# Patient Record
Sex: MALE | Race: WHITE | NOT HISPANIC OR LATINO | Employment: STUDENT | URBAN - METROPOLITAN AREA
[De-identification: names, ages, dates, MRNs, and addresses within clinical notes are randomized per-mention and may not be internally consistent; named-entity substitution may affect disease eponyms.]

---
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
## 2023-01-03 ENCOUNTER — TELEMEDICINE (OUTPATIENT)
Dept: PSYCHIATRY | Facility: CLINIC | Age: 18
End: 2023-01-03

## 2023-01-03 DIAGNOSIS — F41.1 GAD (GENERALIZED ANXIETY DISORDER): ICD-10-CM

## 2023-01-03 DIAGNOSIS — M25.512 ACUTE PAIN OF LEFT SHOULDER: ICD-10-CM

## 2023-01-03 DIAGNOSIS — F42.2 MIXED OBSESSIONAL THOUGHTS AND ACTS: Primary | ICD-10-CM

## 2023-01-03 RX ORDER — SERTRALINE HYDROCHLORIDE 100 MG/1
200 TABLET, FILM COATED ORAL DAILY
Qty: 180 TABLET | Refills: 0 | Status: SHIPPED | OUTPATIENT
Start: 2023-01-03

## 2023-01-03 NOTE — BH TREATMENT PLAN
TREATMENT PLAN (Medication Management Only)         [facility]     Name and Date of Birth:  Oziel Owensyssa Carry y o  2005  Date of Treatment Plan: August 4, 2020, updated November 11, 2020, June 23, 2021, December 22, 2021, July 13, 2022, January 3, 2023  Diagnosis/Diagnoses:    1  Mixed obsessional thoughts and acts       Strengths/Personal Resources for Self-Care: supportive family, taking medications as prescribed, ability to communicate needs, ability to listen, average or above intelligence  Area/Areas of need (in own words): obsessive-compulsive symptoms  1          Long Term Goal: continue improvement in obsessive thoughts and compulsions  Target Date: 6 months - 7/3/2023  Person/Persons responsible for completion of goal: Oziel and provider  2          Short Term Objective (s) - How will we reach this goal?:   A  Provider new recommended medication/dosage changes and/or continue medication(s): continue current medications as prescribed  B  reframing negative thoughts  C  Recognizing distorted thought  Target Date: 6 months - 7/3/2023  Person/Persons Responsible for Completion of Goal: Oziel and hillary  Progress Towards Goals: continuing treatment  Treatment Modality: medication management every 3 months  Review due 180 days from date of this plan: 6 months -7 /3/2023  Expected length of service: ongoing treatment  My Physician/PA/NP and I have developed this plan together and I agree to work on the goals and objectives   I understand the treatment goals that were developed for my treatment

## 2023-01-03 NOTE — PSYCH
Virtual Regular Visit    Problem List Items Addressed This Visit    None    Reason for visit is   Chief Complaint   Patient presents with   • OCD   • Depression   • Medication Management     Encounter provider Sam Cobian, PhD    Provider located at [address] [place] [address]  [address]  [facility]  [phone]    Recent Visits  No visits were found meeting these conditions  Showing recent visits within past 7 days and meeting all other requirements  Today's Visits  Date Type Provider Dept   23 Telemedicine Sam Cobian, PhD [facility]   Showing today's visits and meeting all other requirements  Future Appointments  No visits were found meeting these conditions  Showing future appointments within next 150 days and meeting all other requirements       After connecting through [employer], the patient was identified by name and date of birth  Aleida Boyer was informed that this is a telemedicine visit and that the visit is being conducted through the [address] Now platform  He agrees to proceed  which may not be secure and therefore, might not be HIPAA-compliant  My office door was closed  No one else was in the room  He acknowledged consent and understanding of privacy and security of the video platform  The patient has agreed to participate and understands they can discontinue the visit at any time  SUBJECTIVE:    Aleida Boyer is a 21 y o  male with a history of OCD, anxiety, depression seen for medication management and mood assessment  Lamonte Barahona reports he is doing well  Cat of 15 years  and he is sad  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 and meals to 5,000 for the year  He has a group of good friends who are social and devout catholics  He is active at school and his Anglican  Lamonte Barahona reports dating a girl from school   OCD increased slightly after cat ; however, it is stabilizing  He is working, eating and sleeping  Occasional intrusive thought  Denies compulsions  Takes medication as prescribed and it is effective  HPI ROS Appetite Changes and Sleep: normal appetite and normal energy level    Review Of Systems:     Mood Anxiety occasionally   Behavior Compulsive Behavior rarely   Thought Content Normal   General Emotional Problems   Personality Normal   Other Psych Symptoms Normal   Constitutional As Noted in HPI   ENT As Noted in HPI   Cardiovascular As Noted in HPI   Respiratory As Noted in HPI   Gastrointestinal As Noted in HPI   Genitourinary As Noted in HPI   Musculoskeletal As Noted in HPI   Integumentary As Noted in HPI   Neurological As Noted in HPI   Endocrine Normal    Other Symptoms Normal        Substance Abuse History:    Social History     Substance and Sexual Activity   Drug Use Never       Family Psychiatric History:     History reviewed  No pertinent family history      Social History     Socioeconomic History   • Marital status: Single     Spouse name: Not on file   • Number of children: Not on file   • Years of education: Not on file   • Highest education level: Not on file   Occupational History   • Not on file   Tobacco Use   • Smoking status: Never   • Smokeless tobacco: Never   Substance and Sexual Activity   • Alcohol use: Never   • Drug use: Never   • Sexual activity: Not on file   Other Topics Concern   • Not on file   Social History Narrative    ** Merged History Encounter **          Social Determinants of Health     Financial Resource Strain: Not on file   Food Insecurity: Not on file   Transportation Needs: Not on file   Physical Activity: Not on file   Stress: Not on file   Social Connections: Not on file   Intimate Partner Violence: Not on file   Housing Stability: Not on file       Past Medical History:   Diagnosis Date   • Allergic rhinitis    • Anxiety    • Asthma    • Seasonal allergies        Past Surgical History:   Procedure Laterality Date   • NO PAST SURGERIES         Current Outpatient Medications   Medication Sig Dispense Refill   • albuterol (PROVENTIL HFA,VENTOLIN HFA) 90 mcg/act inhaler Inhale 2 puffs every 6 (six) hours as needed for wheezing  • diphenhydrAMINE (BENADRYL) 50 MG tablet Take 50 mg by mouth every 6 (six) hours as needed for itching  • EPINEPHrine (EPIPEN) 0 3 mg/0 3 mL SOAJ      • fluticasone (FLONASE) 50 mcg/act nasal spray 1 spray into each nostril daily as needed for rhinitis     • Loratadine (CLARITIN PO) Take by mouth daily as needed      • MELATONIN PO Take by mouth     • montelukast (SINGULAIR) 10 mg tablet Take 10 mg by mouth daily at bedtime  • Multiple Vitamins-Minerals (Multivitamin Adult) CHEW Chew daily     • sertraline (ZOLOFT) 100 mg tablet TAKE 2 TABLETS BY MOUTH EVERY  tablet 0     No current facility-administered medications for this visit          Allergies   Allergen Reactions   • Nuts - Food Allergy Anaphylaxis     Tree nuts and peanuts   • Pollen Extract Nasal Congestion       The following portions of the patient's history were reviewed and updated as appropriate: allergies, current medications, past family history, past medical history, past social history, past surgical history and problem list     OBJECTIVE:     Mental Status Examination:    Appearance calm and cooperative , adequate hygiene and grooming and good eye contact    Mood euthymic   Affect affect appropriate    Speech a normal rate   Thought Processes normal thought processes   Hallucinations no hallucinations present    Thought Content no delusions   Abnormal Thoughts no suicidal thoughts  and no homicidal thoughts    Orientation  oriented to person and place and time   Remote Memory short term memory intact and long term memory intact   Attention Span concentration intact   Intellect Appears to be of Average Intelligence   Insight Insight intact   Judgement judgment was intact   Muscle Strength Muscle strength and tone were normal   Language no difficulty naming common objects   Fund of Knowledge displays adequate knowledge of current events   Pain none   Pain Scale 0       Laboratory Results: No results found for this or any previous visit  Assessment/Plan:       There are no diagnoses linked to this encounter        Treatment Recommendations- Risks Benefits      Immediate Medical/Psychiatric/Psychotherapy Treatments and Any Precautions:  reviewed medication continue with treatment plan    Risks, Benefits And Possible Side Effects Of Medications:  {PSYCH RISK, BENEFITS AND POSSIBLE SIDE EFFECTS (Optional):82108    Psychotherapy Provided: 25 min  Supportive therapy  Medication evaluation  Mood assessment  Coping with the loss of his cat  Goals for the upcoming year    Goals discussed in session: stable moods, control of OCD symptoms    Treatment Plan:    Completed and signed during the session: Yes - Treatment Plan done but not signed at time of office visit due to:  Plan reviewed by video and verbal consent given due to [address] enacted  August 4, 2020, updated November 11, 2020, June 23, 2021, December 22, 2021, July 13, 2022, January 3, 2023      Lianet Fox, PhD 01/03/23

## 2023-04-04 DIAGNOSIS — F42.2 MIXED OBSESSIONAL THOUGHTS AND ACTS: ICD-10-CM

## 2023-04-04 RX ORDER — SERTRALINE HYDROCHLORIDE 100 MG/1
TABLET, FILM COATED ORAL
Qty: 180 TABLET | Refills: 0 | Status: SHIPPED | OUTPATIENT
Start: 2023-04-04

## 2023-05-06 ENCOUNTER — HOSPITAL ENCOUNTER (EMERGENCY)
Facility: HOSPITAL | Age: 18
End: 2023-05-07
Attending: EMERGENCY MEDICINE
Payer: COMMERCIAL

## 2023-05-06 DIAGNOSIS — R45.851 SUICIDAL IDEATION: Primary | ICD-10-CM

## 2023-05-06 LAB
ALBUMIN SERPL-MCNC: 4.2 G/DL (ref 3.4–5)
ALBUMIN SERPL-MCNC: 5.4 G/DL (ref 3.4–5)
ALP SERPL-CCNC: 64 IU/L (ref 35–126)
ALP SERPL-CCNC: 83 IU/L (ref 35–126)
ALT SERPL-CCNC: 16 IU/L (ref 16–63)
ALT SERPL-CCNC: 17 IU/L (ref 16–63)
AMPHET UR QL SCN: NOT DETECTED
ANION GAP SERPL CALC-SCNC: 18 MEQ/L (ref 3–15)
ANION GAP SERPL CALC-SCNC: 7 MEQ/L (ref 3–15)
APAP SERPL-MCNC: <10 UG/ML (ref 10–30)
AST SERPL-CCNC: 26 IU/L (ref 15–41)
AST SERPL-CCNC: 33 IU/L (ref 15–41)
BARBITURATES UR QL SCN: NOT DETECTED
BASOPHILS # BLD: 0.06 K/UL (ref 0.01–0.1)
BASOPHILS NFR BLD: 0.5 %
BENZODIAZ UR QL SCN: NOT DETECTED
BILIRUB SERPL-MCNC: 0.6 MG/DL (ref 0.3–1.2)
BILIRUB SERPL-MCNC: 0.7 MG/DL (ref 0.3–1.2)
BUN SERPL-MCNC: 16 MG/DL (ref 8–20)
BUN SERPL-MCNC: 18 MG/DL (ref 8–20)
CALCIUM SERPL-MCNC: 10 MG/DL (ref 8.9–10.3)
CALCIUM SERPL-MCNC: 8.4 MG/DL (ref 8.9–10.3)
CANNABINOIDS UR QL SCN: NOT DETECTED
CHLORIDE SERPL-SCNC: 105 MEQ/L (ref 98–109)
CHLORIDE SERPL-SCNC: 109 MEQ/L (ref 98–109)
CO2 SERPL-SCNC: 16 MEQ/L (ref 22–32)
CO2 SERPL-SCNC: 23 MEQ/L (ref 22–32)
COCAINE UR QL SCN: NOT DETECTED
CREAT SERPL-MCNC: 0.8 MG/DL (ref 0.8–1.3)
CREAT SERPL-MCNC: 0.9 MG/DL (ref 0.8–1.3)
DIFFERENTIAL METHOD BLD: ABNORMAL
EOSINOPHIL # BLD: 0.1 K/UL (ref 0.04–0.54)
EOSINOPHIL NFR BLD: 0.8 %
ERYTHROCYTE [DISTWIDTH] IN BLOOD BY AUTOMATED COUNT: 12.1 % (ref 11.6–14.4)
ETHANOL SERPL-MCNC: 18 MG/DL
GFR SERPL CREATININE-BSD FRML MDRD: >60 ML/MIN/1.73M*2
GFR SERPL CREATININE-BSD FRML MDRD: >60 ML/MIN/1.73M*2
GLUCOSE SERPL-MCNC: 112 MG/DL (ref 70–99)
GLUCOSE SERPL-MCNC: 143 MG/DL (ref 70–99)
HCT VFR BLDCO AUTO: 44.5 % (ref 40.1–51)
HGB BLD-MCNC: 15.9 G/DL (ref 13.7–17.5)
IMM GRANULOCYTES # BLD AUTO: 0.06 K/UL (ref 0–0.08)
IMM GRANULOCYTES NFR BLD AUTO: 0.5 %
LYMPHOCYTES # BLD: 1.3 K/UL (ref 1.2–3.5)
LYMPHOCYTES NFR BLD: 11 %
MCH RBC QN AUTO: 31.2 PG (ref 28–33.2)
MCHC RBC AUTO-ENTMCNC: 35.7 G/DL (ref 32.2–36.5)
MCV RBC AUTO: 87.3 FL (ref 83–98)
MONOCYTES # BLD: 0.75 K/UL (ref 0.3–1)
MONOCYTES NFR BLD: 6.4 %
NEUTROPHILS # BLD: 9.51 K/UL (ref 1.7–7)
NEUTS SEG NFR BLD: 80.8 %
NRBC BLD-RTO: 0 %
OPIATES UR QL SCN: NOT DETECTED
PCP UR QL SCN: NOT DETECTED
PDW BLD AUTO: 11.4 FL (ref 9.4–12.4)
PLATELET # BLD AUTO: 264 K/UL (ref 150–350)
POTASSIUM SERPL-SCNC: 3.4 MEQ/L (ref 3.6–5.1)
POTASSIUM SERPL-SCNC: 3.7 MEQ/L (ref 3.6–5.1)
PROT SERPL-MCNC: 6.8 G/DL (ref 6–8.2)
PROT SERPL-MCNC: 8.6 G/DL (ref 6–8.2)
RBC # BLD AUTO: 5.1 M/UL (ref 4.5–5.8)
SALICYLATES SERPL-MCNC: <4 MG/DL
SARS-COV-2 RNA RESP QL NAA+PROBE: NEGATIVE
SODIUM SERPL-SCNC: 139 MEQ/L (ref 136–144)
SODIUM SERPL-SCNC: 139 MEQ/L (ref 136–144)
WBC # BLD AUTO: 11.78 K/UL (ref 3.8–10.5)

## 2023-05-06 PROCEDURE — 36415 COLL VENOUS BLD VENIPUNCTURE: CPT | Performed by: EMERGENCY MEDICINE

## 2023-05-06 PROCEDURE — 80053 COMPREHEN METABOLIC PANEL: CPT | Performed by: EMERGENCY MEDICINE

## 2023-05-06 PROCEDURE — 99284 EMERGENCY DEPT VISIT MOD MDM: CPT | Mod: 25

## 2023-05-06 PROCEDURE — 96375 TX/PRO/DX INJ NEW DRUG ADDON: CPT

## 2023-05-06 PROCEDURE — 96361 HYDRATE IV INFUSION ADD-ON: CPT

## 2023-05-06 PROCEDURE — 85025 COMPLETE CBC W/AUTO DIFF WBC: CPT | Performed by: EMERGENCY MEDICINE

## 2023-05-06 PROCEDURE — 25800000 HC PHARMACY IV SOLUTIONS: Performed by: STUDENT IN AN ORGANIZED HEALTH CARE EDUCATION/TRAINING PROGRAM

## 2023-05-06 PROCEDURE — 3E033NZ INTRODUCTION OF ANALGESICS, HYPNOTICS, SEDATIVES INTO PERIPHERAL VEIN, PERCUTANEOUS APPROACH: ICD-10-PCS | Performed by: EMERGENCY MEDICINE

## 2023-05-06 PROCEDURE — 63600000 HC DRUGS/DETAIL CODE: Performed by: STUDENT IN AN ORGANIZED HEALTH CARE EDUCATION/TRAINING PROGRAM

## 2023-05-06 PROCEDURE — U0002 COVID-19 LAB TEST NON-CDC: HCPCS | Performed by: EMERGENCY MEDICINE

## 2023-05-06 PROCEDURE — G0480 DRUG TEST DEF 1-7 CLASSES: HCPCS | Performed by: EMERGENCY MEDICINE

## 2023-05-06 PROCEDURE — 80307 DRUG TEST PRSMV CHEM ANLYZR: CPT | Performed by: STUDENT IN AN ORGANIZED HEALTH CARE EDUCATION/TRAINING PROGRAM

## 2023-05-06 PROCEDURE — 3E033GC INTRODUCTION OF OTHER THERAPEUTIC SUBSTANCE INTO PERIPHERAL VEIN, PERCUTANEOUS APPROACH: ICD-10-PCS | Performed by: EMERGENCY MEDICINE

## 2023-05-06 PROCEDURE — 84450 TRANSFERASE (AST) (SGOT): CPT | Performed by: STUDENT IN AN ORGANIZED HEALTH CARE EDUCATION/TRAINING PROGRAM

## 2023-05-06 PROCEDURE — 3E0337Z INTRODUCTION OF ELECTROLYTIC AND WATER BALANCE SUBSTANCE INTO PERIPHERAL VEIN, PERCUTANEOUS APPROACH: ICD-10-PCS | Performed by: EMERGENCY MEDICINE

## 2023-05-06 PROCEDURE — 96374 THER/PROPH/DIAG INJ IV PUSH: CPT

## 2023-05-06 RX ORDER — LORAZEPAM 2 MG/ML
1 INJECTION INTRAMUSCULAR EVERY 2 HOUR PRN
Status: DISCONTINUED | OUTPATIENT
Start: 2023-05-06 | End: 2023-05-07 | Stop reason: HOSPADM

## 2023-05-06 RX ORDER — LORAZEPAM 2 MG/ML
1 INJECTION INTRAMUSCULAR EVERY 30 MIN PRN
Status: DISCONTINUED | OUTPATIENT
Start: 2023-05-06 | End: 2023-05-07 | Stop reason: HOSPADM

## 2023-05-06 RX ORDER — ALBUTEROL SULFATE 90 UG/1
2 INHALANT RESPIRATORY (INHALATION) EVERY 6 HOURS PRN
COMMUNITY

## 2023-05-06 RX ORDER — SERTRALINE HYDROCHLORIDE 25 MG/1
200 TABLET, FILM COATED ORAL DAILY
COMMUNITY
End: 2023-05-07 | Stop reason: SDUPTHER

## 2023-05-06 RX ORDER — LORAZEPAM 2 MG/ML
1 INJECTION INTRAMUSCULAR
Status: DISCONTINUED | OUTPATIENT
Start: 2023-05-06 | End: 2023-05-07 | Stop reason: HOSPADM

## 2023-05-06 RX ORDER — ONDANSETRON HYDROCHLORIDE 2 MG/ML
4 INJECTION, SOLUTION INTRAVENOUS ONCE
Status: COMPLETED | OUTPATIENT
Start: 2023-05-06 | End: 2023-05-06

## 2023-05-06 RX ADMIN — LORAZEPAM 1 MG: 2 INJECTION INTRAMUSCULAR; INTRAVENOUS at 17:03

## 2023-05-06 RX ADMIN — ONDANSETRON 4 MG: 2 INJECTION INTRAMUSCULAR; INTRAVENOUS at 15:52

## 2023-05-06 RX ADMIN — SODIUM CHLORIDE 1000 ML: 9 INJECTION, SOLUTION INTRAVENOUS at 15:54

## 2023-05-06 RX ADMIN — SODIUM CHLORIDE 1000 ML: 9 INJECTION, SOLUTION INTRAVENOUS at 17:03

## 2023-05-06 ASSESSMENT — ENCOUNTER SYMPTOMS
VOMITING: 1
DIAPHORESIS: 1
FEVER: 0
DIFFICULTY URINATING: 0
NECK PAIN: 0
NAUSEA: 1
HEADACHES: 0
WEAKNESS: 0
SHORTNESS OF BREATH: 0
BACK PAIN: 0
ABDOMINAL PAIN: 0
NERVOUS/ANXIOUS: 1
HALLUCINATIONS: 0

## 2023-05-06 NOTE — ED PROVIDER NOTES
"Emergency Medicine Note  HPI   HISTORY OF PRESENT ILLNESS       History provided by:  Patient  History limited by: EtOH intoxication.   used: No       17 y/o M with PMH of asthma, depression presents to the ED for psychiatric evaluation.  Patient with a history of depression for which he regularly takes Zoloft.  Last few weeks patient reports that he has had increased stressors with finals at school at [employer] and preparing for the LSAT.  Additionally, patient is Roman Catholic and identifies as delvalle.  He notes this weighs on him heavily.  He has been suicidal for a while but everything \"came to ahead last night\" while drinking.  He notes that he purposely goes out to drink as a form of self-harm where he drinks heavily.  He drank a significant amount of hard liquor last night.  Last drink around 3 or 4 AM last night.  He called the suicide hotline this morning and asked his friend to bring him here for further evaluation.  Upon arrival here, patient is actively suicidal and he had a plan to end his life with either drugs or alcohol.  No prior suicidal attempts.  He does not actively follow with a therapist or psychiatrist.  He has no prior inpatient hospitalizations.  Denies homicidal ideation, self-harm behaviors, or auditory visual hallucinations.  He is actively diaphoretic with nausea and vomiting.  Denies abdominal pain, chest pain, trouble breathing, urinary symptoms, headaches.  No recent illness.          Patient History   PAST HISTORY     Reviewed from Nursing Triage:  Tobacco  Allergies  Meds  Problems  Med Hx  Surg Hx  Fam Hx  Soc   Hx      Past Medical History:   Diagnosis Date   • Asthma    • Depression        History reviewed. No pertinent surgical history.    History reviewed. No pertinent family history.           Review of Systems   REVIEW OF SYSTEMS     Review of Systems   Reason unable to perform ROS: EtOH intoxication.   Constitutional: Positive for " diaphoresis. Negative for fever.   HENT: Negative for congestion.    Respiratory: Negative for shortness of breath.    Cardiovascular: Negative for chest pain.   Gastrointestinal: Positive for nausea and vomiting. Negative for abdominal pain.   Genitourinary: Negative for difficulty urinating.   Musculoskeletal: Negative for back pain and neck pain.   Skin: Negative for rash.   Neurological: Negative for weakness and headaches.   Psychiatric/Behavioral: Positive for suicidal ideas. Negative for hallucinations. The patient is nervous/anxious.          VITALS     ED Vitals    Date/Time Temp Pulse Resp BP SpO2 [facility]   05/07/23 1132 -- 87 15 108/62 98 % LMS   05/07/23 0923 -- 90 17 111/69 98 % LMS   05/06/23 2015 -- 106 -- 116/61 -- OMB   05/06/23 1945 -- 106 18 116/61 98 % OMB   05/06/23 1705 -- 103 17 119/63 98 % OMB   05/06/23 1531 36 °C (96.8 °F) 118 18 127/67 98 % OMB        Pulse Ox %: 98 % (05/06/23 1531)  Pulse Ox Interpretation: Normal (05/06/23 1531)  Heart Rate: 118 (05/06/23 1531)        Physical Exam   PHYSICAL EXAM     Physical Exam  Vitals and nursing note reviewed.   Constitutional:       General: He is not in acute distress.     Appearance: He is well-developed. He is diaphoretic.   HENT:      Head: Normocephalic and atraumatic.      Nose: Nose normal.      Mouth/Throat:      Mouth: Mucous membranes are moist.   Eyes:      Conjunctiva/sclera: Conjunctivae normal.   Cardiovascular:      Rate and Rhythm: Regular rhythm. Tachycardia present.      Heart sounds: Normal heart sounds.   Pulmonary:      Effort: Pulmonary effort is normal. No respiratory distress.      Breath sounds: Normal breath sounds.   Abdominal:      Palpations: Abdomen is soft.      Tenderness: There is no abdominal tenderness.   Musculoskeletal:         General: Normal range of motion.      Cervical back: Normal range of motion and neck supple.   Skin:     General: Skin is warm.      Findings: No rash.   Neurological:      General: No  focal deficit present.      Mental Status: He is alert and oriented to person, place, and time.      Comments: Tremulous.   Psychiatric:         Mood and Affect: Mood is anxious and depressed.         Speech: Speech normal.         Thought Content: Thought content includes suicidal ideation. Thought content does not include homicidal ideation. Thought content includes suicidal plan. Thought content does not include homicidal plan.           PROCEDURES     Procedures     DATA     Results     Procedure Component Value Units Date/Time    [place] Draw Panel [[id number]] Collected: 05/06/23 1539    Specimen: Blood, Venous Updated: 05/07/23 0000    Narrative:      The following orders were created for panel order [place] Draw Panel.  Procedure                               Abnormality         Status                     ---------                               -----------         ------                     RAINBOW RED[[id number]]                                      Final result               RAINBOW LT BLUE[[id number]]                                  Final result               RAINBOW GOLD[[id number]]                                     Final result                 Please view results for these tests on the individual orders.    RAINBOW RED [[id number]] Collected: 05/06/23 1539    Specimen: Blood, Venous Updated: 05/07/23 0000    RAINBOW LT BLUE [[id number]] Collected: 05/06/23 1539    Specimen: Blood, Venous Updated: 05/07/23 0000    RAINBOW GOLD [[id number]] Collected: 05/06/23 1539    Specimen: Blood, Venous Updated: 05/07/23 0000    Comprehensive metabolic panel [[id number]]  (Abnormal) Collected: 05/2003    Specimen: Blood, Venous Updated: 05/06/23 2028     Sodium 139 mEQ/L      Potassium 3.7 mEQ/L      Comment: Results obtained on plasma. Plasma Potassium values may be up to 0.4 mEQ/L less than serum values. The differences may be greater for patients with high platelet or white cell counts.        Chloride 109 mEQ/L       CO2 23 mEQ/L      BUN 18 mg/dL      Creatinine 0.8 mg/dL      Glucose 143 mg/dL      Calcium 8.4 mg/dL      AST (SGOT) 26 IU/L      ALT (SGPT) 16 IU/L      Alkaline Phosphatase 64 IU/L      Total Protein 6.8 g/dL      Comment: Test performed on plasma which typically contains approximately 0.4 g/dL more protein than serum.        Albumin 4.2 g/dL      Bilirubin, Total 0.6 mg/dL      eGFR >60.0 mL/min/1.73m*2      Anion Gap 7 mEQ/L     Urine drug screen (UDS) [[id number]]  (Normal) Collected: 05/06/23 1914    Specimen: Urine, Clean Catch Updated: 05/06/23 1938     PCP Scrn, Ur Not Detected     Comment: Assay Detects: phencyclidine in urine. Lowest detectable concentration is 25 ng/mL of phencyclidine.        Benzodiazepine Ur Qual Not Detected     Comment: Assay Detects: benzodiazepines and metabolites at varying concentrations. Lowest detectable concentration is 200 ng/mL of oxazepam.        Cocaine Screen, Urine Not Detected     Comment: Assay Detects: benzoylecgonine and cocaine in urine. Lowest detectable concentration is 300 ng/mL of benzoylecgonine.        Amphetamine+Methamphetamine Screen, Ur Not Detected     Comment: Assay Detects: d-methamphetamine, d-amphetamine, methlyenedioxyamphetamine (MDA), and methlyenendioxymethamphetamine (MDMA) in urine. Lowest detectable concentration is 1000 ng/mL of d-methamphetamine.  Assay is less sensitive to MDA and MDMA (lowest detectable concentration, 2500 ng/mL) and could produce a false negative result. If MDMA overdose is suspected and the result is negative, a more specific test should be requested.        Cannabinoid Screen, Urine Not Detected     Comment: Assay Detects: cannabinoid metabolites in urine. Lowest detectable concentration is 50 ng/mL        Opiate Scrn, Ur Not Detected     Comment: Assay Detects: codeine, dihydrocodeine, hydrocodone, hydromorphone, levorphanol, morphine, morphine-3-glucuronide, norcodeine, oxycodone in urine. Lowest detectable  concentration is 300 ng/mL of morphine.        Barbiturate Screen, Ur Not Detected     Comment: Assay Detects: alphenal, amobarbital, aprobarbital, barbital, butabarbital, butalbital, butethal, diallybarbital, pentobarbital, secobarbital,talbutal, and thiopental. Lowest detectable concentration is 200 ng/mL of secobarbital.       SARS-CoV-2 (COVID-19), PCR Nasopharynx [[id number]]  (Normal) Collected: 05/06/23 1729    Specimen: Nasopharyngeal Swab from Nasopharynx Updated: 05/06/23 1801    Narrative:      The following orders were created for panel order SARS-CoV-2 (COVID-19), PCR Nasopharynx.  Procedure                               Abnormality         Status                     ---------                               -----------         ------                     SARS-CoV-2 (COVID-19), P...[[id number]]  Normal              Final result                 Please view results for these tests on the individual orders.    SARS-CoV-2 (COVID-19), PCR Nasopharynx [[id number]]  (Normal) Collected: 05/06/23 1729    Specimen: Nasopharyngeal Swab from Nasopharynx Updated: 05/06/23 1801     SARS-CoV-2 (COVID-19) Negative    Narrative:      Testing performed using real-time PCR for detection of COVID-19. EUA approved validation studies performed on site.     ER toxicology screen, serum [[id number]]  (Abnormal) Collected: 05/06/23 1539    Specimen: Blood, Venous Updated: 05/06/23 1615     Salicylate <4.0 mg/dL      Acetaminophen <10.0 ug/mL      Ethanol 18 mg/dL     Comprehensive metabolic panel [[id number]]  (Abnormal) Collected: 05/06/23 1539    Specimen: Blood, Venous Updated: 05/06/23 1615     Sodium 139 mEQ/L      Potassium 3.4 mEQ/L      Comment: Results obtained on plasma. Plasma Potassium values may be up to 0.4 mEQ/L less than serum values. The differences may be greater for patients with high platelet or white cell counts.        Chloride 105 mEQ/L      CO2 16 mEQ/L      BUN 16 mg/dL      Creatinine 0.9 mg/dL      Glucose 112  mg/dL      Calcium 10.0 mg/dL      AST (SGOT) 33 IU/L      ALT (SGPT) 17 IU/L      Alkaline Phosphatase 83 IU/L      Total Protein 8.6 g/dL      Comment: Test performed on plasma which typically contains approximately 0.4 g/dL more protein than serum.        Albumin 5.4 g/dL      Bilirubin, Total 0.7 mg/dL      eGFR >60.0 mL/min/1.73m*2      Anion Gap 18 mEQ/L     CBC and differential [[id number]]  (Abnormal) Collected: 05/06/23 1539    Specimen: Blood, Venous Updated: 05/06/23 1554     WBC 11.78 K/uL      RBC 5.10 M/uL      Hemoglobin 15.9 g/dL      Hematocrit 44.5 %      MCV 87.3 fL      MCH 31.2 pg      MCHC 35.7 g/dL      RDW 12.1 %      Platelets 264 K/uL      MPV 11.4 fL      Differential Type Auto     nRBC 0.0 %      Immature Granulocytes 0.5 %      Neutrophils 80.8 %      Lymphocytes 11.0 %      Monocytes 6.4 %      Eosinophils 0.8 %      Basophils 0.5 %      Immature Granulocytes, Absolute 0.06 K/uL      Neutrophils, Absolute 9.51 K/uL      Lymphocytes, Absolute 1.30 K/uL      Monocytes, Absolute 0.75 K/uL      Eosinophils, Absolute 0.10 K/uL      Basophils, Absolute 0.06 K/uL           Imaging Results    None         No orders to display       Scoring tools                                  ED Course & MDM   MDM / ED COURSE / CLINICAL IMPRESSION / DISPO     MDM    ED Course as of 05/08/23 1609   Sat May 06, 2023   1549 Impression: SI, ETOH intoxication, no prior inpatient hospitalizations.     Plan: labs, ER tox screen UDS, IVF, zofran, 1:1, consult crisis    Discussed case with Dr. Oates. [EB]   1634 Ethanol(!): 18 [EB]   1641 Anion Gap(!): 18  Mildly elevated, IV fluids infused [DW]   1735 Per RN, patient feeling better at this time - Less anxious and less nauseated. [EB]   1959 UDS unremarkable. Awaiting repeat CMP for clearance. [EB]   2103 Anion Gap: 7  Improved. Medically cleared. [EB]   2103 Paged crisis. [EB]      ED Course User Index  [DW] Bernie Oates MD  [EB] Lin Leslie PA C     Clinical  Impression      Suicidal ideation     _________________     ED Disposition   Transfer to Behavioral Health                   Lin Leslie PA C  05/08/23 5090

## 2023-05-06 NOTE — ED ATTESTATION NOTE
Procedures      5/6/2023  4:00 PM  I have personally seen and examined the patient.  I personally performed the key components of the encounter and provided medical decision making for this patient. I reviewed and agree with the PA/NP/Resident's assessment and plan of care, with any exceptions as documented below.    My focused history, examination, assessment, and plan of care of Santos Turcios is as follows:    HPI: The patient is a 18 y.o. who comes to the ED for depression, alcohol use, suicidal ideation.  Numerous stressors.  Patient was occults did not    I was provided additional history from: EMS.    Pertinent past medical history includes: depression, asthma      Exam: Awake, alert, nontoxic.  Afebrile.  Normotensive.  Heart rate regular.  No respiratory distress.  Calm cooperative.  Moves all extremities spontaneously.  Vital Signs Review: Vital signs have been reviewed. The oxygen saturation is SpO2: 98 %  which is normal.          Impression/Plan/Medical decision making/ED course: EtOH 18.  Will have behavioral health evaluate for suicidal ideation.  Will maintain one-to-one           Disposition: Dispo pending BHE              NOTE: Patient seen during a time of significantly increased volumes, decreased capacity and staff. Portion of management and initial evaluation may have been done while in the waiting room because of this. This document was created using dragon dictation software.  There might be some typographical errors due to this technology.         Bernie Oates MD  05/08/23 2852

## 2023-05-07 VITALS
OXYGEN SATURATION: 98 % | RESPIRATION RATE: 15 BRPM | HEIGHT: 66 IN | BODY MASS INDEX: 20.89 KG/M2 | HEART RATE: 87 BPM | TEMPERATURE: 96.8 F | SYSTOLIC BLOOD PRESSURE: 108 MMHG | WEIGHT: 130 LBS | DIASTOLIC BLOOD PRESSURE: 62 MMHG

## 2023-05-07 PROCEDURE — 63700000 HC SELF-ADMINISTRABLE DRUG: Performed by: EMERGENCY MEDICINE

## 2023-05-07 PROCEDURE — 63700000 HC SELF-ADMINISTRABLE DRUG: Performed by: STUDENT IN AN ORGANIZED HEALTH CARE EDUCATION/TRAINING PROGRAM

## 2023-05-07 RX ORDER — SERTRALINE HYDROCHLORIDE 50 MG/1
200 TABLET, FILM COATED ORAL ONCE
Status: COMPLETED | OUTPATIENT
Start: 2023-05-07 | End: 2023-05-07

## 2023-05-07 RX ORDER — IBUPROFEN/PSEUDOEPHEDRINE HCL 200MG-30MG
3 TABLET ORAL ONCE
Status: COMPLETED | OUTPATIENT
Start: 2023-05-07 | End: 2023-05-07

## 2023-05-07 RX ORDER — IBUPROFEN/PSEUDOEPHEDRINE HCL 200MG-30MG
3 TABLET ORAL NIGHTLY
Status: DISCONTINUED | OUTPATIENT
Start: 2023-05-07 | End: 2023-05-07

## 2023-05-07 RX ORDER — SERTRALINE HYDROCHLORIDE 50 MG/1
25 TABLET, FILM COATED ORAL ONCE
Status: DISCONTINUED | OUTPATIENT
Start: 2023-05-07 | End: 2023-05-07

## 2023-05-07 RX ORDER — SERTRALINE HYDROCHLORIDE 100 MG/1
200 TABLET, FILM COATED ORAL DAILY
COMMUNITY
Start: 2023-04-04

## 2023-05-07 RX ADMIN — Medication 3 MG: at 03:23

## 2023-05-07 RX ADMIN — SERTRALINE HYDROCHLORIDE 200 MG: 50 TABLET ORAL at 11:29

## 2023-05-07 ASSESSMENT — COGNITIVE AND FUNCTIONAL STATUS - GENERAL
INSIGHT: IMPAIRED, MODERATELY
SLEEP_WAKE_CYCLE: DECREASED
EYE_CONTACT: WNL
PSYCHOMOTOR FUNCTIONING: WNL
SPEECH: REGULAR
IMPULSE CONTROL: IMPAIRED, MODERATELY
CONCENTRATION: WNL
APPETITE: INCREASED
LIBIDO: DECREASED
PERCEPTUAL FUNCTION: NORMAL
THOUGHT_CONTENT: APPROPRIATE
REMOTE MEMORY: WNL
AFFECT: FULL RANGE
MOOD: HOPELESS;DEPRESSED;ANXIOUS
RECENT MEMORY: WNL
ORIENTATION: FULLY ORIENTED
ATTENTION: WNL
JUDGEMENT: IMPAIRED, MODERATELY
APPEARANCE: WELL GROOMED
THOUGHT_PROCESS: WNL
AROUSAL LEVEL: ALERT

## 2023-05-07 NOTE — BEHAVIORAL HEALTH CRISIS PROGRESS NOTE
Pt in need of bed search for inpt psych, on a 201.   8:12 am   [facility] conducted bed search:  [facility] - bed available. Requesting SSN. [facility] checked with pt but he can not recall his SSN.   [facility]) - bed available.   9:00 am  [facility] received return call from [facility] ([facility]). Can accept pt to their [facility] unit or [facility]. Needs copy of 201 - [facility] faxed. [facility] spoke with pt and informed of options. Pt decided on [facility]. [facility] called back admissions ([facility]) and informed. Accepting Doc: Dr. Jin Pisano. Pt signed transport form. [facility] to complete pre-certification.   ED Tech assisted with ordering transportation through [facility]. ETA: 12:30 pm. [facility] contacted [facility] ([facility]) and informed of ETA.   Pt used phone and updated pts on his acceptance.

## 2023-05-07 NOTE — CONSULTS
Name: Santos Turcios : 2005    Date and Time: 2023 12:53:32 AM    Location of the patient: [facility] ED Location of the doctor: My home office , [place]    Length of consult: 60 min      This evaluation was conducted via video telepsychiatry with the assistance of onsite staff    Reason for consult: suicidal thoughts    Requested by: aries    History of Present Illness: 18 years old male Patient presents to the emergency department (ED) for psychiatric evaluation following an episode of heavy alcohol consumption and suicidal ideation.The patient has a past medical history of asthma and OCD, depression. He has been regularly taking Zoloft for his depression. Over the past few weeks, the patient has experienced increased stress due to school finals at [employer] and preparing for the [employer]. He identifies as a delvalle Latter-day, which he reports weighs heavily on him. The patient has experienced suicidal thoughts for a while, which intensified last night after consuming a significant amount of hard liquor. His last drink was around 3 or 4 AM.This morning, the patient called the suicide hotline and asked a friend to bring him to the ED for further evaluation. Upon arrival, he is actively suicidal and has a plan to end his life using either drugs or alcohol. The patient has no prior suicide attempts, does not actively follow with a therapist or psychiatrist, and has no prior inpatient hospitalizations. He denies homicidal ideation, self-harm behaviors, or auditory visual hallucinations.    Collateral Contacted: No Reason for not contacting the collateral:Patient meets criteria for admission    Sleep issues?: Yes Sleep Quantity: poor Sleep Quality: poor    Psychiatric History/Treatment History:     Past diagnoses: ocd    Hospitalizations: Yes Description:    Current Treatment:Yes Medication management: Yes Medications: zoloft Therapy: No      Suicide  Assessment:      PSS-3:      1) Over the past 2 weeks have you felt down, depressed or hopeless? Yes   2) Over the past 2 weeks have you had thoughts of killing yourself? Yes  3) Have you ever in your life attempted to kill yourself? Yes  Within the past 6 months?      PSS-3 Secondary Screen:    1) Positive on PSS-3 questions 2 & 3 - active SI with a past attempt? No    2) Have you been thinking about how you might kill yourself? Yes    3) Have you had some intention of acting on your thoughts? Yes    4) Lifetime psychiatric hospitalization? Yes    5) Has drinking or substance abuse ever been a problem for you? Yes   Description: alcohol not daily but frequently  6) Current irritability, agitation, or aggression? No          PSS-3 Secondary Screen Scoring: Moderate Notes:    Mild (0-2) No current attempt and no plan/intent  Moderate (3-4) No current attempt, Plan OR intent but not both  Severe (5-6) Current Attempt with Plan AND intent    [facility] Safety Assessment:    Risk Factors    Stressors: studies, being bisexual and distaced fromyounger brother      Attempts/Self-injury: No      Impulsivity:No      Drug/Alcohol History:Yes Description: binge drinking      Trauma History:No      Access to firearms:No      HI/Violence/Property destruction:      Legal: No      Family Psych History:Unknown-NA       Family History of suicide:Yes Description: grand uncle and aunt    Protective Factors:      Can handle stress well? No       Confucianist? Yes      External:     Social supports/ Therapeutic relationships: Yes Description: family     Relationship history: single     Living situation: live in dorm     Employment: No     Education: [employer] in college     Responsibility to family/children/work: Yes Description:     Future orientation:Unknown-NA    Health History:     Medical History: asthma     Medications & Freq: albuterol, zoloft 200 mg daily     Allergies: nka    Mental Status Exam:    Appearance and Attire:  Normal    Psychomotor agitation: No abnormality    Attitude and behavior: Cooperative    Speech: No abnormality,    Mood: Depressed, Anxious    Affect: Flat    Thought process: Coherent    Thought content: Suicidal ideation    Perception:    Intel: Average    Abstract: Edson    Language: No abnormality    Orientation: Oriented x 4    Sense: Normal    Knowledge: Appropriate for education and socioeconomic status    Memory: Intact    Insight: Moderate impairment    Judgement: Moderate impairment    Gait: in bed    Impression/Risk Assessment:    Current Suicide Risk Elevated? Yes      Current Violence Risk Elevated? No      Issues with ability to care for self? No      Summary: The patient is a 18-year-old male with a history of asthma and depression, presenting to the ED with active suicidal ideation and a plan after a night of heavy alcohol consumption. The patient's increased stressors, along with his struggle with his sexual identity within his Roman Catholic beliefs, have likely contributed to the exacerbation of his depressive symptoms. It is recommended that the patient be voluntarily admitted to an inpatient psychiatric unit for further evaluation, stabilization, and initiation of appropriate treatment. During the inpatient stay, the patient's alcohol consumption and potential withdrawal symptoms should be monitored and managed as needed.    Diagnosis: F42 Obsessive-compulsive disorder, F43.23 Adjustment disorder with mixed anxiety and depressed mood    CPT Codes: 70389 - Psychiatric Diagnostic Evaluation with Medical Services    Treatment Plan:      General:     Level of Care: inpatient psychiatric admission     Psychiatric Clearance: No      Observation level - 1:1 needed?: Yes Notes: will leave for ed physician disgression     Pharmacological:     Patient psychotic?No     Therapy: Patient will require referral to outpatient mental health on discharge     Follow up needed while in the hospital?: Yes Number of  times:     Discussed plan with onsite team member: Yes Misael chris     Other:      Clinician Signature

## 2023-05-07 NOTE — BEHAVIORAL HEALTH CRISIS PROGRESS NOTE
[facility]- met Pt at bedside to complete an assessment after Pt contacted [id number] and was transported to the ED via ambulance. Pt shared he was in a dark place after consuming unknown amount of alcohol Pt shared he was feeling suicidal with a passive plan to drink himself to death or take sleep aid pills. Pt shared his concern with social life due to constantly feeling like he has to act a certain way around family and friends. Pt placed on [facility] board to meet with a psychiatrist for consult psych services.     Pt was assessed and agreeable to Dr. Anderson's recommendation for  inpatient treatment. 201 was presented by [facility] and signed by Pt. Pt was provide a few options for inpatient treatment to review with his parents once a facility is decided [facility] will complete bed search depending on availability of bed.     [facility]- [facility] Adult bed and willing to review

## 2023-05-07 NOTE — CONSULTS
PCP    UNABLE, TO OBTAIN PCP  Patient Information    Patient Name   Santos Turcios    Address   [address] [facility] [address] [facility] [address]    Race   White                    Patient Legal Name   Santos Turcios    Legal Sex   Male    Date of Birth   2005                    Room   16    Ethnic Group   Not , /a, or [place]raini origin    Language   English                    MRN   [id number]    Phone Numbers   Hm: [phone] Cell: [phone]    PCP   Unable, To Obtain Pcp                      Patient Contacts    Name Relation Home Work Mobile   Efraín Turcios Father   [phone]     Documents Filed to Patient    Power of  Living Will Clinical Unknown Study Attachment Consent Form ABN Waiver After Visit Summary Lab Result Scan Code Status [facility] MyChart Status Advance Care Planning    Not on File  Not on File  Not on File  Not on File  Not on File  Not on File  Not on File  Not on File  Not on file  Inactive Jump to the Activity      Auth/Cert Information    Hospital account [id number] has no auth/cert information available.     Bed Days    No auth/cert for hospital account [id number]; no bed days information is available.     Admission Information    Current Information    Attending Provider Admitting Provider Admission Type Admission Status     Emergency Confirmed Admission - ED Roomed          Admission Date/Time Discharge Date Hospital Service Auth/Cert Status   05/06/23  1526  Emergency Medicine Incomplete          Hospital Area Unit Room/Bed    [facility] ED 16/ED16              Hospital Account    Name Acct ID Class Status Primary Coverage   Santos Turcios [id number] Emergency Open IBC - [facility] OOA PPO/HMO            Guarantor Account (for Hospital Account #[id number])    Name Relation to Pt Service Area Active? Acct Type   Santos Turcios Self ML Yes Personal/Family   Address Phone     [address] [place] [address] [phone](H)               Coverage Information (for Hospital Account #[id number])    F/O Payor/Plan Precert #   IBC/BLUE CROSS BLUE SHIELD OOA PPO/HMO    Subscriber Subscriber #   Efraín Turcios [id number]   Address Phone   [address] [address]   [place] [address] [phone]               Patient Information     Patient Name  Santos Turcios MRN  [id number] Legal Sex  Male  Age  2005 (18 y.o.) [facility]         Admit Date Department Dept Phone    2023 [facility] Emergency Department [phone]       Presenting Problems - Sun May 07, 2023     Row Name 0013       Presenting Problems    Who accompanied patient today? Pt transported by ambulance    Presenting Problems Pt is a 19yo male who was brought into the ED via ambulance. 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 Pt states he started drinking heavy last night due to being in a dark place and termoil with his religous beliefs and his sexual prefernce. Pt shared he feels like he is acting when with certain family and friends due to his choice when it comes to a relationship and what the bible says about same sex relationships. Pt admits SI with a passive plan of taking pills and or drinking himself to death. Pt. denies HI, AVH, SIB, and access to firearms. Pt shared he has a history of picking his skin when is feels anxious and is prescribed zoloft.    Patient Experiencing difficulty concentrating;worthlessness;significant decrease in interest/libido;phobias    Phobia comments due to religous beliefs    Stressors sexual preference             Mental Status Exam - Sun May 07, 2023     Row Name 0024       Mental Status Exam    Arousal Level Alert    Appearance Well Groomed    Speech Regular    Psychomotor Functioning WNL    Eye Contact WNL    Orientation Fully oriented    Attention WNL    Concentration WNL    Recent Memory WNL    Remote Memory WNL    Thought Content Appropriate    Thought Process WNL     Insight Impaired, moderately    Judgement impaired, moderately    Impulse Control Impaired, moderately    Perceptual Function Normal    Sleeping Decreased    Appetite Increased    Libido Decreased    Affect Full Range    Mood Hopeless;Depressed;Anxious             Suicide and Homicide Risk - Sun May 07, 2023     Row Name 0026       [facility] Suicide and Homicide Risk    Do you currently have any suicidal ideation or thoughts? Yes    Do you currently or have you had any thoughts of self-harm? No    Do you currently have homicidal ideation or have you ever harmed anyone else?  No    Do you have easy access to firearms? No             Safe-T Assessment - Sun May 07, 2023     Row Name 0026       SAFE-T Assessment Risk Factors      Current/Past Psychiatric Disorders Mood disorders    Key symptoms Impulsivity;Anxiety/panic    Precipitants/Stressors/Interpersonal/Triggers Events leading to humiliation, shame or despair;Intoxication    Access to fire arms. No       SAFE-T Assessment Protective Factors    Internal factors Islam beliefs    External Factors Spiritual and/or Moral attitues against suicide       SAFE-T Assessment Suicidal Inquiry     In the last month, how many times have you had suicidal thoughts? Once a week    In the last month, when you have had suicidal thoughts, how long do they last? Less than 1 hour/some of the time    In the last month, could/can you stop thinking about killing yourself or wanting to die if you want to? can control thoughts with some difficulty    In the last month, are there things - anyone or anything (i.e. family, Worship, pain of death) - that stopped you from wanting to die or acting on thoughts of suicide?  Deterrents probably stopped you    In the last month, what sorts of reasons did you have for thinking about wanting to die or killing yourself? Completely to end or stop the pain (you couldn’t go on living with the pain or how you were feeling)       SAFE-T Assessment  Determination of Risk and Interventions    Safe T Assessment of Risk:  Moderate Suicide Risk    Interventions Develop Safety Plan;Provide Emergency/Crisis numbers            Alcohol Use     Not Asked.      Tobacco Use     Never assessed smoking status.    Smokeless Tobacco: Unknown status of smokeless tobacco use.      Problem List  Current as of 05/07/23 0306           No problems recorded      Allergies    No Known Allergies     Results (last 24 hours)     Procedure Component Value Units Date/Time    [facility] Draw Panel [[id number]] Collected: 05/06/23 1539    Order Status: Completed Specimen: Blood, Venous Updated: 05/07/23 0000    Narrative:      The following orders were created for panel order [facility] Draw Panel.  Procedure                               Abnormality         Status                     ---------                               -----------         ------                     RAINBOW RED[[id number]]                                      Final result               RAINBOW LT BLUE[[id number]]                                  Final result               RAINBOW GOLD[[id number]]                                     Final result                 Please view results for these tests on the individual orders.    Comprehensive metabolic panel [[id number]]  (Abnormal) Collected: 05/2003    Order Status: Completed Specimen: Blood, Venous Updated: 05/06/23 2028     Sodium 139 mEQ/L      Potassium 3.7 mEQ/L      Chloride 109 mEQ/L      CO2 23 mEQ/L      BUN 18 mg/dL      Creatinine 0.8 mg/dL      Glucose 143 mg/dL      Calcium 8.4 mg/dL      AST (SGOT) 26 IU/L      ALT (SGPT) 16 IU/L      Alkaline Phosphatase 64 IU/L      Total Protein 6.8 g/dL      Albumin 4.2 g/dL      Bilirubin, Total 0.6 mg/dL      eGFR >60.0 mL/min/1.73m*2      Anion Gap 7 mEQ/L     Urine drug screen (UDS) [[id number]]  (Normal) Collected: 05/06/23 1914    Order Status: Completed Specimen: Urine, Clean Catch Updated: 05/06/23 1938     PCP Scrn, Ur  Not Detected     Benzodiazepine Ur Qual Not Detected     Cocaine Screen, Urine Not Detected     Amphetamine+Methamphetamine Screen, Ur Not Detected     Cannabinoid Screen, Urine Not Detected     Opiate Scrn, Ur Not Detected     Barbiturate Screen, Ur Not Detected    ER toxicology screen, serum [[id number]]  (Abnormal) Collected: 05/06/23 1539    Order Status: Completed Specimen: Blood, Venous Updated: 05/06/23 1615     Salicylate <4.0 mg/dL      Acetaminophen <10.0 ug/mL      Ethanol 18 mg/dL     Comprehensive metabolic panel [[id number]]  (Abnormal) Collected: 05/06/23 1539    Order Status: Completed Specimen: Blood, Venous Updated: 05/06/23 1615     Sodium 139 mEQ/L      Potassium 3.4 mEQ/L      Chloride 105 mEQ/L      CO2 16 mEQ/L      BUN 16 mg/dL      Creatinine 0.9 mg/dL      Glucose 112 mg/dL      Calcium 10.0 mg/dL      AST (SGOT) 33 IU/L      ALT (SGPT) 17 IU/L      Alkaline Phosphatase 83 IU/L      Total Protein 8.6 g/dL      Albumin 5.4 g/dL      Bilirubin, Total 0.7 mg/dL      eGFR >60.0 mL/min/1.73m*2      Anion Gap 18 mEQ/L     CBC and differential [[id number]]  (Abnormal) Collected: 05/06/23 1539    Order Status: Completed Specimen: Blood, Venous Updated: 05/06/23 1554     WBC 11.78 K/uL      RBC 5.10 M/uL      Hemoglobin 15.9 g/dL      Hematocrit 44.5 %      MCV 87.3 fL      MCH 31.2 pg      MCHC 35.7 g/dL      RDW 12.1 %      Platelets 264 K/uL      MPV 11.4 fL      Differential Type Auto     nRBC 0.0 %      Immature Granulocytes 0.5 %      Neutrophils 80.8 %      Lymphocytes 11.0 %      Monocytes 6.4 %      Eosinophils 0.8 %      Basophils 0.5 %      Immature Granulocytes, Absolute 0.06 K/uL      Neutrophils, Absolute 9.51 K/uL      Lymphocytes, Absolute 1.30 K/uL      Monocytes, Absolute 0.75 K/uL      Eosinophils, Absolute 0.10 K/uL      Basophils, Absolute 0.06 K/uL       Medical History     Diagnosis Date Comment Source    Asthma       Depression         Surgical History          No past surgical  history on file.       Mental Health/Substance Use Treatment - [place] May 07, 2023     Row Name 0029       Previous Mental Health Treatment    Previous Mental Health Treatment inpatient treatment    IP Treatment Provider/Reason OCD inpatient treatment in [place]    IP Treatment Compliant yes    IP Treatment Years compliant 2 weeks       Current Mental Health Treatment    Current Mental Health Treatment medication    Medication Provider/Reason private psychiatrist OCD    Medication Compliant yes    Medication Years compliant 8 yrs             Living Environment - Sun May 07, 2023     Row Name 0030       Living Environment    People in Home parent(s)    Name(s) of People in Home Kassidy- Father    Current Living Arrangements other (see comments)    Duration at Residence 2 years [facility] a college campus    Primary Care Provided by parent(s)    Provides Primary Care For no one    Caregiving Concerns College student    Family Caregiver if Needed none    Quality of Family Relationships supportive    Able to Return to Prior Arrangements yes       County Agency Involved    County Agencies Involved? No            Employment History     No employment history on file.      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 Pt was brought in to the ED due to thoughts of suicide with a passive plan to take melatonin or drink himself to death. Pt shared he is struggling with his identity and feels he is acting when  around family and friends. Pt recently decided he wanted to be in a same sex relationship but it put him in a dark place due to his Taoism of being [employer]. Pt denies any HI, AVH, SIB, and access to firearms. Pt would benefit from therapy and medication management.    Recommended level of care Outpatient Psych PHP/IOP    Patient refused treatment recommendation No    Suicide Resource Information Provided yes            Radiology Results (last 24 hours)    No matching results found     ECG Results (last 24 hours)    No matching results found     Microbiology Results     Procedure Component Value Units Date/Time    SARS-CoV-2 (COVID-19), PCR Nasopharynx [[id number]]  (Normal) Collected: 05/06/23 1729    Specimen: Nasopharyngeal Swab from Nasopharynx Updated: 05/06/23 1801    Narrative:      The following orders were created for panel order SARS-CoV-2 (COVID-19), PCR Nasopharynx.  Procedure                               Abnormality         Status                     ---------                               -----------         ------                     SARS-CoV-2 (COVID-19), P...[[id number]]  Normal              Final result                 Please view results for these tests on the individual orders.    SARS-CoV-2 (COVID-19), PCR Nasopharynx [[id number]]  (Normal) Collected: 05/06/23 1729    Specimen: Nasopharyngeal Swab from Nasopharynx Updated: 05/06/23 1801     SARS-CoV-2 (COVID-19) Negative    Narrative:      Testing performed using real-time PCR for detection of COVID-19. EUA approved validation studies performed on site.       Home Medications         Taking? Start Date End Date Provider     albuterol HFA 90 mcg/actuation inhaler   --  --  ProviderAngelika MD     Inhale 2 puffs every 6 (six) hours as needed for wheezing.     sertraline (ZOLOFT) 25 mg tablet   --  --  ProviderAngelika MD     Take 200 mg by mouth daily. Once daily 2 times a day

## 2023-05-07 NOTE — BEHAVIORAL HEALTH CRISIS PROGRESS NOTE
Kami Osborn   Behavioral Health Crisis Clinician  Behavioral Health   Consults      Signed  Date of Service:  5/7/2023  3:05 AM     Signed             PCP     UNABLE, TO OBTAIN PCP  Patient Information     Patient Name   Santos Turcios    Address   [address] [id number]    Race   White                        Patient Legal Name   Santos Turcios    Legal Sex   Male    Date of Birth   2005                        Room   16    Ethnic Group   Not , /a, or [place] Islander origin    Language   English                        MRN   [id number]    Phone Numbers   Hm: [phone] Cell: [phone]    PCP   Unable, To Obtain Pcp                           Patient Contacts     Name Relation Home Work Mobile   Efraín Turcios Father     [phone]      Documents Filed to Patient     Power of  Living Will Clinical Unknown Study Attachment Consent Form ABN Waiver After Visit Summary Lab Result Scan Code Status [facility] MyChart Status Advance Care Planning    Not on File  Not on File  Not on File  Not on File  Not on File  Not on File  Not on File  Not on File  Not on file  Inactive Jump to the Activity       Auth/Cert Information     Hospital account [id number] has no auth/cert information available.      Bed Days     No auth/cert for hospital account [id number]; no bed days information is available.      Admission Information     Current Information     Attending Provider Admitting Provider Admission Type Admission Status       Emergency Confirmed Admission - ED Roomed             Admission Date/Time Discharge Date Hospital Service Auth/Cert Status   05/06/23  1526   Emergency Medicine Incomplete             Hospital Area Unit Room/Bed     [facility] ED 16/ED16                  Hospital Account             Name Acct ID Class Status Primary Coverage   Santos Turcios [id number] Emergency Open IBC - [facility] OOA PPO/HMO                 Guarantor Account (for Hospital Account #[id number])             Name Relation to Pt Service Area Active? Acct Type   Santos Turcios Self [facility] Yes Personal/Family   Address Phone       [address] [place] [address] [phone](H)                    Coverage Information (for Hospital Account #[id number])          F/O Payor/Plan Precert #   IBC/BLUE CROSS BLUE SHIELD OOA PPO/HMO     Subscriber Subscriber #   Efraín Turcios [id number]   Address Phone   [address] [address]   [place] [address] [phone]                               Patient Information                Patient Name  Santos Turcios MRN  [id number] Legal Sex  Male  Age  2005 (18 y.o.) N                 Admit Date Department Dept Phone     2023 [facility] Emergency Department [phone]                          Presenting Problems - Sun May 07, 2023      Row Name 0013           Presenting Problems     Who accompanied patient today? Pt transported by ambulance     Presenting Problems Pt is a 19yo male who was brought into the ED via ambulance. 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 Pt states he started drinking heavy last night due to being in a dark place and termoil with his religous beliefs and his sexual prefernce. Pt shared he feels like he is acting when with certain family and friends due to his choice when it comes to a relationship and what the bible says about same sex relationships. Pt admits SI with a passive plan of taking pills and or drinking himself to death. Pt. denies HI, AVH, SIB, and access to firearms. Pt shared he has a history of picking his skin when is feels anxious and is prescribed zoloft.     Patient Experiencing difficulty concentrating;worthlessness;significant decrease in interest/libido;phobias     Phobia comments due to religous beliefs     Stressors sexual preference                            Mental Status Exam - Sun  May 07, 2023      Row Name 0024           Mental Status Exam     Arousal Level Alert     Appearance Well Groomed     Speech Regular     Psychomotor Functioning WNL     Eye Contact WNL     Orientation Fully oriented     Attention WNL     Concentration WNL     Recent Memory WNL     Remote Memory WNL     Thought Content Appropriate     Thought Process WNL     Insight Impaired, moderately     Judgement impaired, moderately     Impulse Control Impaired, moderately     Perceptual Function Normal     Sleeping Decreased     Appetite Increased     Libido Decreased     Affect Full Range     Mood Hopeless;Depressed;Anxious                                 Suicide and Homicide Risk - Sun May 07, 2023      Row Name 0026           [facility] Suicide and Homicide Risk     Do you currently have any suicidal ideation or thoughts? Yes     Do you currently or have you had any thoughts of self-harm? No     Do you currently have homicidal ideation or have you ever harmed anyone else?  No     Do you have easy access to firearms? No                                 Safe-T Assessment - Sun May 07, 2023      Row Name 0026           SAFE-T Assessment Risk Factors       Current/Past Psychiatric Disorders Mood disorders     Key symptoms Impulsivity;Anxiety/panic     Precipitants/Stressors/Interpersonal/Triggers Events leading to humiliation, shame or despair;Intoxication     Access to fire arms. No           SAFE-T Assessment Protective Factors     Internal factors Anabaptist beliefs     External Factors Spiritual and/or Moral attitues against suicide           SAFE-T Assessment Suicidal Inquiry      In the last month, how many times have you had suicidal thoughts? Once a week     In the last month, when you have had suicidal thoughts, how long do they last? Less than 1 hour/some of the time     In the last month, could/can you stop thinking about killing yourself or wanting to die if you want to? can control thoughts with some difficulty     In the  last month, are there things - anyone or anything (i.e. family, Scientology, pain of death) - that stopped you from wanting to die or acting on thoughts of suicide?  Deterrents probably stopped you     In the last month, what sorts of reasons did you have for thinking about wanting to die or killing yourself? Completely to end or stop the pain (you couldn’t go on living with the pain or how you were feeling)           SAFE-T Assessment Determination of Risk and Interventions     Safe T Assessment of Risk:  Moderate Suicide Risk     Interventions Develop Safety Plan;Provide Emergency/Crisis numbers                          Alcohol Use            Not Asked.                   Tobacco Use            Never assessed smoking status.     Smokeless Tobacco: Unknown status of smokeless tobacco use.                   Problem List  Current as of 05/07/23 0306             No problems recorded       Allergies    No Known Allergies                      Results (last 24 hours)      Procedure Component Value Units Date/Time     [place] Draw Panel [[id number]] Collected: 05/06/23 1539     Order Status: Completed Specimen: Blood, Venous Updated: 05/07/23 0000     Narrative:       The following orders were created for panel order [place] Draw Panel.  Procedure                               Abnormality         Status                     ---------                               -----------         ------                     RAINBOW RED[[id number]]                                      Final result               RAINBOW LT BLUE[[id number]]                                  Final result               RAINBOW GOLD[[id number]]                                     Final result                  Please view results for these tests on the individual orders.     Comprehensive metabolic panel [[id number]]  (Abnormal) Collected: 05/2003     Order Status: Completed Specimen: Blood, Venous Updated: 05/06/23 2028       Sodium 139 mEQ/L         Potassium 3.7  mEQ/L         Chloride 109 mEQ/L         CO2 23 mEQ/L         BUN 18 mg/dL         Creatinine 0.8 mg/dL         Glucose 143 mg/dL         Calcium 8.4 mg/dL         AST (SGOT) 26 IU/L         ALT (SGPT) 16 IU/L         Alkaline Phosphatase 64 IU/L         Total Protein 6.8 g/dL         Albumin 4.2 g/dL         Bilirubin, Total 0.6 mg/dL         eGFR >60.0 mL/min/1.73m*2         Anion Gap 7 mEQ/L       Urine drug screen (UDS) [[id number]]  (Normal) Collected: 05/06/23 1914     Order Status: Completed Specimen: Urine, Clean Catch Updated: 05/06/23 1938       PCP Scrn, Ur Not Detected       Benzodiazepine Ur Qual Not Detected       Cocaine Screen, Urine Not Detected       Amphetamine+Methamphetamine Screen, Ur Not Detected       Cannabinoid Screen, Urine Not Detected       Opiate Scrn, Ur Not Detected       Barbiturate Screen, Ur Not Detected     ER toxicology screen, serum [[id number]]  (Abnormal) Collected: 05/06/23 1539     Order Status: Completed Specimen: Blood, Venous Updated: 05/06/23 1615       Salicylate <4.0 mg/dL         Acetaminophen <10.0 ug/mL         Ethanol 18 mg/dL       Comprehensive metabolic panel [[id number]]  (Abnormal) Collected: 05/06/23 1539     Order Status: Completed Specimen: Blood, Venous Updated: 05/06/23 1615       Sodium 139 mEQ/L         Potassium 3.4 mEQ/L         Chloride 105 mEQ/L         CO2 16 mEQ/L         BUN 16 mg/dL         Creatinine 0.9 mg/dL         Glucose 112 mg/dL         Calcium 10.0 mg/dL         AST (SGOT) 33 IU/L         ALT (SGPT) 17 IU/L         Alkaline Phosphatase 83 IU/L         Total Protein 8.6 g/dL         Albumin 5.4 g/dL         Bilirubin, Total 0.7 mg/dL         eGFR >60.0 mL/min/1.73m*2         Anion Gap 18 mEQ/L       CBC and differential [[id number]]  (Abnormal) Collected: 05/06/23 1539     Order Status: Completed Specimen: Blood, Venous Updated: 05/06/23 1554       WBC 11.78 K/uL         RBC 5.10 M/uL         Hemoglobin 15.9 g/dL         Hematocrit 44.5 %          MCV 87.3 fL         MCH 31.2 pg         MCHC 35.7 g/dL         RDW 12.1 %         Platelets 264 K/uL         MPV 11.4 fL         Differential Type Auto       nRBC 0.0 %         Immature Granulocytes 0.5 %         Neutrophils 80.8 %         Lymphocytes 11.0 %         Monocytes 6.4 %         Eosinophils 0.8 %         Basophils 0.5 %         Immature Granulocytes, Absolute 0.06 K/uL         Neutrophils, Absolute 9.51 K/uL         Lymphocytes, Absolute 1.30 K/uL         Monocytes, Absolute 0.75 K/uL         Eosinophils, Absolute 0.10 K/uL         Basophils, Absolute 0.06 K/uL                       Medical History      Diagnosis Date Comment Source     Asthma           Depression                     Surgical History            No past surgical history on file.                  Mental Health/Substance Use Treatment - [place] May 07, 2023      Row Name 0029           Previous Mental Health Treatment     Previous Mental Health Treatment inpatient treatment     IP Treatment Provider/Reason OCD inpatient treatment in [place]     IP Treatment Compliant yes     IP Treatment Years compliant 2 weeks           Current Mental Health Treatment     Current Mental Health Treatment medication     Medication Provider/Reason private psychiatrist OCD     Medication Compliant yes     Medication Years compliant 8 yrs                         Living Environment - Sun May 07, 2023      Row Name 0030           Living Environment     People in Home parent(s)     Name(s) of People in Home Kassidy- Father     Current Living Arrangements other (see comments)     Duration at Residence 2 years on a college campus     Primary Care Provided by parent(s)     Provides Primary Care For no one     Caregiving Concerns College student     Family Caregiver if Needed none     Quality of Family Relationships supportive     Able to Return to Prior Arrangements yes           County Agency Involved     County Agencies Involved? No                       Employment History      No employment history on file.               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 Pt was brought in to the ED due to thoughts of suicide with a passive plan to take melatonin or drink himself to death. Pt shared he is struggling with his identity and feels he is acting when around family and friends. Pt recently decided he wanted to be in a same sex relationship but it put him in a dark place due to his Yazdanism of being Cheondoism. Pt denies any HI, AVH, SIB, and access to firearms. Pt would benefit from therapy and medication management.     Recommended level of care Outpatient Psych PHP/IOP     Patient refused treatment recommendation No     Suicide Resource Information Provided yes              Radiology Results (last 24 hours)    No matching results found      ECG Results (last 24 hours)    No matching results found                      Microbiology Results      Procedure Component Value Units Date/Time     SARS-CoV-2 (COVID-19), PCR Nasopharynx [[id number]]  (Normal) Collected: 05/06/23 1729     Specimen: Nasopharyngeal Swab from Nasopharynx Updated: 05/06/23 1801     Narrative:       The following orders were created for panel order SARS-CoV-2 (COVID-19), PCR Nasopharynx.  Procedure                               Abnormality         Status                     ---------                                -----------         ------                     SARS-CoV-2 (COVID-19), P...[[id number]]  Normal              Final result                  Please view results for these tests on the individual orders.     SARS-CoV-2 (COVID-19), PCR Nasopharynx [[id number]]  (Normal) Collected: 23 1729     Specimen: Nasopharyngeal Swab from Nasopharynx Updated: 23 180       SARS-CoV-2 (COVID-19) Negative     Narrative:       Testing performed using real-time PCR for detection of COVID-19. EUA approved validation studies performed on site.                          Home Medications          Taking? Start Date End Date Provider      albuterol HFA 90 mcg/actuation inhaler   --  --  ProviderAngelika MD       Inhale 2 puffs every 6 (six) hours as needed for wheezing.      sertraline (ZOLOFT) 25 mg tablet   --  --  Provider, Angelika Garnica MD       Take 200 mg by mouth daily. Once daily 2 times a day                          Valeria Muñoz MD   Physician  Psychiatry  Consults      Signed  Date of Service:  2023  1:27 AM  Consult Orders   Consult to Behavioral Health Crisis [[id number]] ordered by Lin Leslie PA C at 23 1556          Signed                   Name: Santos Turcios : 2005    Date and Time: 2023 12:53:32 AM    Location of the patient: [facility] ED Location of the doctor: My home office , [place], [place]    Length of consult: 60 min      This evaluation was conducted via video telepsychiatry with the assistance of onsite staff    Reason for consult: suicidal thoughts    Requested by: aries    History of Present Illness: 18 years old male Patient presents to the emergency department (ED) for psychiatric evaluation following an episode of heavy alcohol consumption and suicidal ideation.The patient has a past medical history of asthma and OCD, depression. He has been regularly taking Zoloft for his depression. Over the past few weeks, the patient has experienced  increased stress due to school finals at [employer] and preparing for the LSAT. He identifies as a delvalle Latter-day, which he reports weighs heavily on him. The patient has experienced suicidal thoughts for a while, which intensified last night after consuming a significant amount of hard liquor. His last drink was around 3 or 4 AM.This morning, the patient called the suicide hotline and asked a friend to bring him to the ED for further evaluation. Upon arrival, he is actively suicidal and has a plan to end his life using either drugs or alcohol. The patient has no prior suicide attempts, does not actively follow with a therapist or psychiatrist, and has no prior inpatient hospitalizations. He denies homicidal ideation, self-harm behaviors, or auditory visual hallucinations.    Collateral Contacted: No Reason for not contacting the collateral:Patient meets criteria for admission    Sleep issues?: Yes Sleep Quantity: poor Sleep Quality: poor    Psychiatric History/Treatment History:     Past diagnoses: ocd    Hospitalizations: Yes Description:    Current Treatment:Yes Medication management: Yes Medications: zoloft Therapy: No      Suicide Assessment:      PSS-3:      1) Over the past 2 weeks have you felt down, depressed or hopeless? Yes   2) Over the past 2 weeks have you had thoughts of killing yourself? Yes  3) Have you ever in your life attempted to kill yourself? Yes  Within the past 6 months?       PSS-3 Secondary Screen:    1) Positive on PSS-3 questions 2 & 3 - active SI with a past attempt? No    2) Have you been thinking about how you might kill yourself? Yes    3) Have you had some intention of acting on your thoughts? Yes    4) Lifetime psychiatric hospitalization? Yes    5) Has drinking or substance abuse ever been a problem for you? Yes   Description: alcohol not daily but frequently  6) Current irritability, agitation, or aggression? No          PSS-3 Secondary Screen Scoring: Moderate  Notes:    Mild (0-2) No current attempt and no plan/intent  Moderate (3-4) No current attempt, Plan OR intent but not both  Severe (5-6) Current Attempt with Plan AND intent     [facility]    Risk Factors    Stressors: studies, being bisexual and distaced fromyounger brother      Attempts/Self-injury: No      Impulsivity:No      Drug/Alcohol History:Yes Description: binge drinking      Trauma History:No      Access to firearms:No      HI/Violence/Property destruction:      Legal: No      Family Psych History:Unknown-NA                 Family History of suicide:Yes Description: grand uncle and aunt    Protective Factors:                Can handle stress well? No                 Methodist? Yes      External:               Social supports/ Therapeutic relationships: Yes Description: family               Relationship history: single               Living situation: live in dorm               Employment: No               Education: blanche in college               Responsibility to family/children/work: Yes Description:               Future orientation:Unknown-NA    Health History:               Medical History: asthma               Medications & Freq: albuterol, zoloft 200 mg daily               Allergies: nka    Mental Status Exam:    Appearance and Attire: Normal     Psychomotor agitation: No abnormality     Attitude and behavior: Cooperative     Speech: No abnormality,     Mood: Depressed, Anxious     Affect: Flat     Thought process: Coherent     Thought content: Suicidal ideation     Perception:     Intel: Average     Abstract: [place]     Language: No abnormality     Orientation: Oriented x 4     Sense: Normal     Knowledge: Appropriate for education and socioeconomic status     Memory: Intact     Insight: Moderate impairment     Judgement: Moderate impairment     Gait: in bed    Impression/Risk Assessment:    Current Suicide Risk Elevated? Yes      Current Violence Risk Elevated? No      Issues  with ability to care for self? No      Summary: The patient is a 18-year-old male with a history of asthma and depression, presenting to the ED with active suicidal ideation and a plan after a night of heavy alcohol consumption. The patient's increased stressors, along with his struggle with his sexual identity within his Cheondoism beliefs, have likely contributed to the exacerbation of his depressive symptoms. It is recommended that the patient be voluntarily admitted to an inpatient psychiatric unit for further evaluation, stabilization, and initiation of appropriate treatment. During the inpatient stay, the patient's alcohol consumption and potential withdrawal symptoms should be monitored and managed as needed.    Diagnosis: F42 Obsessive-compulsive disorder, F43.23 Adjustment disorder with mixed anxiety and depressed mood    CPT Codes: 30882 - Psychiatric Diagnostic Evaluation with Medical Services    Treatment Plan:                General:               Level of Care: inpatient psychiatric admission               Psychiatric Clearance: No                Observation level - 1:1 needed?: Yes Notes: will leave for ed physician disgression               Pharmacological:               Patient psychotic?No               Therapy: Patient will require referral to outpatient mental health on discharge               Follow up needed while in the hospital?: Yes Number of times:               Discussed plan with onsite team member: Yes Misael chris               Other:       Clinician Signature

## 2023-05-14 DIAGNOSIS — F42.2 MIXED OBSESSIONAL THOUGHTS AND ACTS: ICD-10-CM

## 2023-05-15 RX ORDER — SERTRALINE HYDROCHLORIDE 100 MG/1
TABLET, FILM COATED ORAL
Qty: 180 TABLET | Refills: 0 | Status: SHIPPED | OUTPATIENT
Start: 2023-05-15

## 2023-06-15 ENCOUNTER — OFFICE VISIT (OUTPATIENT)
Dept: PSYCHIATRY | Facility: CLINIC | Age: 18
End: 2023-06-15
Payer: COMMERCIAL

## 2023-06-15 VITALS
WEIGHT: 133 LBS | HEIGHT: 66 IN | SYSTOLIC BLOOD PRESSURE: 113 MMHG | HEART RATE: 65 BPM | BODY MASS INDEX: 21.38 KG/M2 | DIASTOLIC BLOOD PRESSURE: 68 MMHG

## 2023-06-15 DIAGNOSIS — F42.2 MIXED OBSESSIONAL THOUGHTS AND ACTS: ICD-10-CM

## 2023-06-15 DIAGNOSIS — F41.1 GAD (GENERALIZED ANXIETY DISORDER): Primary | ICD-10-CM

## 2023-06-15 PROCEDURE — 99214 OFFICE O/P EST MOD 30 MIN: CPT | Performed by: NURSE PRACTITIONER

## 2023-06-15 NOTE — LETTER
Leandra 15, 2023     Patient: Gerhardt Real  YOB: 2005  Date of Visit: 6/15/2023      To Whom it May Concern:    Soniya Florian is under my professional care for OCD and Major Depressive Disorder  He is prescribed Trazodone and Sertraline  It is medically necessary that Liana  is provided with a single room due to his anxiety and mood  Liana  was seen in my office on 6/15/2023  If you have any questions or concerns, please don't hesitate to call           Sincerely,              Rosalie Cortez, PhD, MPH, MSN, APRN

## 2023-06-22 PROBLEM — F66 GENDER IDENTITY UNCERTAINTY: Status: ACTIVE | Noted: 2023-06-22

## 2023-06-22 RX ORDER — TRAZODONE HYDROCHLORIDE 50 MG/1
50 TABLET ORAL
COMMUNITY
Start: 2023-06-14

## 2023-06-22 RX ORDER — HYDROXYZINE PAMOATE 25 MG/1
25 CAPSULE ORAL 2 TIMES DAILY PRN
COMMUNITY
Start: 2023-06-03

## 2023-06-22 NOTE — PSYCH
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  In May, he had suicidal ideations and called [phone]  He was hospitalized and after discharge spent a week in [facility] then IOP  He is taking the summer off from school  Sleeping is difficult and he started Trazodone which is effective, Zoloft was increased to 200 mg daily  Reports his appetite is good, denies SI, and family are supportive  Takes medication as prescribed  HPI ROS Appetite Changes and Sleep: normal appetite and normal energy level    Review Of Systems:     Mood Anxiety and Depression   Behavior Normal    Thought Content Disturbing Thoughts, Feelings   General Emotional Problems and Sleep Disturbances   Personality Normal   Other Psych Symptoms Normal   Constitutional As Noted in HPI   ENT As Noted in HPI   Cardiovascular As Noted in HPI   Respiratory As Noted in HPI   Gastrointestinal As Noted in HPI   Genitourinary As Noted in HPI   Musculoskeletal As Noted in HPI   Integumentary As Noted in HPI   Neurological As Noted in HPI   Endocrine Normal    Other Symptoms Normal      Substance Abuse History:  Social History     Substance and Sexual Activity   Drug Use Never       Family Psychiatric History: No family history on file      The following portions of the patient's history were reviewed and updated as appropriate: allergies, current medications, past family history, past medical history, past social history, past surgical history and problem list     Social History     Socioeconomic History   • Marital status: Single     Spouse name: Not on file   • Number of children: Not on file   • Years of education: Not on file   • Highest education level: Not on file   Occupational History   • Not on file   Tobacco Use   • Smoking status: Never   • Smokeless tobacco: Never   Substance and Sexual Activity   • Alcohol use: Never   • Drug use: Never   • Sexual activity: Not on file   Other Topics Concern   • Not on file   Social History Narrative    ** Merged History Encounter **          Social Determinants of Health     Financial Resource Strain: Not on file   Food Insecurity: Not on file   Transportation Needs: Not on file   Physical Activity: Not on file   Stress: Not on file   Social Connections: Not on file   Intimate Partner Violence: Not on file   Housing Stability: Not on file     Social History     Social History Narrative    ** Merged History Encounter **            Objective:       Mental status:  Appearance calm and cooperative , adequate hygiene and grooming and good eye contact    Mood anxious   Affect affect appropriate    Speech a normal rate   Thought Processes normal thought processes   Hallucinations no hallucinations present    Thought Content no delusions   Abnormal Thoughts no suicidal thoughts  and no homicidal thoughts    Orientation  oriented to person and place and time   Remote Memory short term memory intact and long term memory intact   Attention Span concentration intact   Intellect Appears to be of Average Intelligence   Insight Insight intact   Judgement judgment was intact   Muscle Strength Muscle strength and tone were normal   Language no difficulty naming common objects   Fund of Knowledge displays adequate knowledge of current events   Pain none   Pain Scale 0       Assessment/Plan:       Diagnoses and all orders for this visit:    LEAH (generalized anxiety disorder)    Mixed obsessional thoughts and acts    Other orders  -     hydrOXYzine pamoate (VISTARIL) 25 mg capsule;  Take 25 mg by mouth 2 (two) times a day as needed for anxiety Take 2 capsules po BID prn for anxiety  -     traZODone (DESYREL) 50 mg tablet; Take 50 mg by mouth daily at bedtime            Treatment Recommendations- Risks Benefits      Immediate Medical/Psychiatric/Psychotherapy Treatments and Any Precautions:  reviewed medication continue with treatment plan    Risks, Benefits And Possible Side Effects Of Medications:  {PSYCH RISK, BENEFITS AND POSSIBLE SIDE EFFECTS (Optional):11243       Psychotherapy Provided:30 min Individual psychotherapy provided     Supportive therapy  Medication evaluation  Mood assessment  Coping skills discussed regarding identity conflict and Nondenominational belief, encouraged therapy    Goals discussed in session: Maintain stable mood    Treatment plan not due

## 2023-08-17 ENCOUNTER — ESTABLISHED COMPREHENSIVE EXAM (OUTPATIENT)
Dept: URBAN - METROPOLITAN AREA CLINIC 10 | Facility: CLINIC | Age: 18
End: 2023-08-17

## 2023-08-17 DIAGNOSIS — H52.13: ICD-10-CM

## 2023-08-17 PROCEDURE — 92014 COMPRE OPH EXAM EST PT 1/>: CPT

## 2023-08-17 PROCEDURE — 92015 DETERMINE REFRACTIVE STATE: CPT

## 2023-08-17 PROCEDURE — MISCOPTOS MISCELLANEOUS, OPTOS

## 2023-08-17 ASSESSMENT — KERATOMETRY
OS_AXISANGLE2_DEGREES: 97
OD_AXISANGLE_DEGREES: 4
OD_K1POWER_DIOPTERS: 43.00
OD_AXISANGLE2_DEGREES: 94
OS_AXISANGLE_DEGREES: 7
OD_K2POWER_DIOPTERS: 44.00
OS_K2POWER_DIOPTERS: 44.00
OS_K1POWER_DIOPTERS: 43.00

## 2023-08-17 ASSESSMENT — VISUAL ACUITY
OD_SC: 20/20
OS_SC: 20/20

## 2023-08-17 ASSESSMENT — TONOMETRY
OD_IOP_MMHG: 14
OS_IOP_MMHG: 15

## 2023-08-30 DIAGNOSIS — G47.09 OTHER INSOMNIA: Primary | ICD-10-CM

## 2023-08-30 RX ORDER — TRAZODONE HYDROCHLORIDE 50 MG/1
50 TABLET ORAL
Qty: 30 TABLET | Refills: 3 | Status: SHIPPED | OUTPATIENT
Start: 2023-08-30

## 2023-09-10 DIAGNOSIS — F42.2 MIXED OBSESSIONAL THOUGHTS AND ACTS: ICD-10-CM

## 2023-09-11 RX ORDER — SERTRALINE HYDROCHLORIDE 100 MG/1
TABLET, FILM COATED ORAL
Qty: 180 TABLET | Refills: 0 | Status: SHIPPED | OUTPATIENT
Start: 2023-09-11

## 2023-09-23 DIAGNOSIS — G47.09 OTHER INSOMNIA: ICD-10-CM

## 2023-09-24 RX ORDER — TRAZODONE HYDROCHLORIDE 50 MG/1
50 TABLET ORAL
Qty: 90 TABLET | Refills: 2 | Status: SHIPPED | OUTPATIENT
Start: 2023-09-24

## 2023-09-25 ENCOUNTER — TELEPHONE (OUTPATIENT)
Dept: BEHAVIORAL/MENTAL HEALTH CLINIC | Facility: CLINIC | Age: 18
End: 2023-09-25

## 2023-09-25 NOTE — TELEPHONE ENCOUNTER
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 I added him to Dr. Cole schedule for 9/26/2023 at 6pm. Waiting for call back to see if he can take that appt.

## 2023-09-26 ENCOUNTER — TELEMEDICINE (OUTPATIENT)
Dept: PSYCHIATRY | Facility: CLINIC | Age: 18
End: 2023-09-26
Payer: COMMERCIAL

## 2023-09-26 DIAGNOSIS — F42.2 MIXED OBSESSIONAL THOUGHTS AND ACTS: ICD-10-CM

## 2023-09-26 DIAGNOSIS — F41.1 GAD (GENERALIZED ANXIETY DISORDER): Primary | ICD-10-CM

## 2023-09-26 DIAGNOSIS — F66 GENDER IDENTITY UNCERTAINTY: ICD-10-CM

## 2023-09-26 PROCEDURE — 90833 PSYTX W PT W E/M 30 MIN: CPT | Performed by: NURSE PRACTITIONER

## 2023-09-26 PROCEDURE — 99214 OFFICE O/P EST MOD 30 MIN: CPT | Performed by: NURSE PRACTITIONER

## 2023-09-26 NOTE — LETTER
September 26, 2023     Patient: Gavin Roman  YOB: 2005  Date of Visit: 9/26/2023      To Whom it May Concern:    Kg Zacarias is under my professional care. Debbie Reese was seen in my office on     Talk out loud to self (I.e. reading question out loud)  Longer test taking time  Use of unlimited scratch paper  OCD previous accomondations, previous school forms, IOP etc.     Email PDF   [employer] medical accomondations    If you have any questions or concerns, please don't hesitate to call.          Sincerely,          Caryl Roe, PhD        CC:   No Recipients

## 2023-09-26 NOTE — LETTER
September 27, 2023     Patient: Tera Paniagua  YOB: 2005  Date of Visit: 9/26/2023      To Whom it May Concern:    Starting on October 15, 2015, Ric Kong has been under my professional care for OCD (F42.2). His initial presentation was anxiety and OCD since he was 9years old. Initially, his symptoms were controllable; however, they began to interfere with his school work and functioning. Although, he was treated with medications at that time and therapy, his symptoms became worse and he was admitted to the [facility]. During this admission, Lily Andrew improved regarding his symptoms and was discharged. He is an intelligent, pleasant young man who continues to exhibit certain OCD behaviors which may interfere with his test taking. In 2020, the last Y-BOCS score of 14 indicated that he continues to have mild obsessions and compulsions; however, has learned to cope with these symptoms. He is prescribed Zoloft and Trazodone that are effective. Therefore, I am requesting that he is provided with the following accommodations during his LSAT examine:    1) Extended test taking time;  2) Permit Lily Andrew to talk out loud to himself during the test and  3) Allow Lily Andrew to have extra scratch paper. Thank you for your assistance. If you have any questions or concerns, please don't hesitate to call.          Sincerely,          Jennifer Chavez, PhD,MPH,APRN        CC: Tera Paniagua

## 2023-10-02 NOTE — PSYCH
Virtual Regular Visit    Problem List Items Addressed This Visit        Other    LEAH (generalized anxiety disorder) - Primary    Gender identity uncertainty    Mixed obsessional thoughts and acts     Reason for visit is   Chief Complaint   Patient presents with   • OCD   • Anxiety   • Depression   • Medication Management     Encounter provider Dariana Garcia, PhD    Provider located at [facility] [address] [place] [address]  [address]  [address] [place], [place] [id number]    Recent Visits  Date Type Provider Dept   09/26/23 Telemedicine Dariana Garcia,  [address]   09/25/23 Telephone Dariana Garcia PhD [facility] Therapist [place]   Showing recent visits within past 7 days and meeting all other requirements  Future Appointments  No visits were found meeting these conditions. Showing future appointments within next 150 days and meeting all other requirements       After connecting through [employer], the patient was identified by name and date of birth. Anne Young was informed that this is a telemedicine visit and that the visit is being conducted through the [employer]. He agrees to proceed. which may not be secure and therefore, might not be HIPAA-compliant. My office door was closed. No one else was in the room. He acknowledged consent and understanding of privacy and security of the video platform. The patient has agreed to participate and understands they can discontinue the visit at any time. SUBJECTIVE:    Anne Young is a 19 y.o. male with a history of OCD, sexual identity, panic disorder seen for medication management and mood assessment. Salinas De Anda reports he is feeling much better and has some passive thoughts of hurting himself however there is no immediate harm he is coping well. Physically he feels good appetite and sleeping are described as good.   Trazodone was increased to 75 mg and this has helped his mood and sleep. He has better coping he talked to his parents about being "grey or maybe bi or queer."  He has been working in the Bible for passages regarding his sexuality and has found comfort. He is planning to apply to law school and requests a letter of accommodations for the LS. He reports he is social, denies suicidal ideation. Takes medication as prescribed and family are supportive    HPI ROS Appetite Changes and Sleep: normal appetite and normal energy level    Review Of Systems:     Mood Anxiety and Depression   Behavior Normal    Thought Content Normal   General Emotional Problems   Personality Normal   Other Psych Symptoms Normal   Constitutional As Noted in HPI   ENT As Noted in HPI   Cardiovascular As Noted in HPI   Respiratory As Noted in HPI   Gastrointestinal As Noted in HPI   Genitourinary As Noted in HPI   Musculoskeletal As Noted in HPI   Integumentary As Noted in HPI   Neurological As Noted in HPI   Endocrine Normal    Other Symptoms Normal        Substance Abuse History:    Social History     Substance and Sexual Activity   Drug Use Never       Family Psychiatric History:     History reviewed. No pertinent family history.     Social History     Socioeconomic History   • Marital status: Single     Spouse name: Not on file   • Number of children: Not on file   • Years of education: Not on file   • Highest education level: Not on file   Occupational History   • Not on file   Tobacco Use   • Smoking status: Never   • Smokeless tobacco: Never   Substance and Sexual Activity   • Alcohol use: Never   • Drug use: Never   • Sexual activity: Not on file   Other Topics Concern   • Not on file   Social History Narrative    ** Merged History Encounter **          Social Determinants of Health     Financial Resource Strain: Not on file   Food Insecurity: Not on file   Transportation Needs: Not on file   Physical Activity: Not on file   Stress: Not on file   Social Connections: Not on file   Intimate Partner Violence: Not on file   Housing Stability: Not on file       Past Medical History:   Diagnosis Date   • Allergic rhinitis    • Anxiety    • Asthma    • Seasonal allergies        Past Surgical History:   Procedure Laterality Date   • NO PAST SURGERIES         Current Outpatient Medications   Medication Sig Dispense Refill   • albuterol (PROVENTIL HFA,VENTOLIN HFA) 90 mcg/act inhaler Inhale 2 puffs every 6 (six) hours as needed for wheezing. • diphenhydrAMINE (BENADRYL) 50 MG tablet Take 50 mg by mouth every 6 (six) hours as needed for itching. • EPINEPHrine (EPIPEN) 0.3 mg/0.3 mL SOAJ      • fluticasone (FLONASE) 50 mcg/act nasal spray 1 spray into each nostril daily as needed for rhinitis     • hydrOXYzine pamoate (VISTARIL) 25 mg capsule Take 25 mg by mouth 2 (two) times a day as needed for anxiety Take 2 capsules po BID prn for anxiety     • Loratadine (CLARITIN PO) Take by mouth daily as needed      • MELATONIN PO Take by mouth     • montelukast (SINGULAIR) 10 mg tablet Take 10 mg by mouth daily at bedtime. • Multiple Vitamins-Minerals (Multivitamin Adult) CHEW Chew daily     • sertraline (ZOLOFT) 100 mg tablet TAKE 2 TABLETS BY MOUTH EVERY  tablet 0   • traZODone (DESYREL) 50 mg tablet TAKE 1 TABLET BY MOUTH DAILY AT BEDTIME 90 tablet 2     No current facility-administered medications for this visit.         Allergies   Allergen Reactions   • Nuts - Food Allergy Anaphylaxis     Tree nuts and peanuts   • Pollen Extract Nasal Congestion       The following portions of the patient's history were reviewed and updated as appropriate: allergies, current medications, past family history, past medical history, past social history, past surgical history and problem list.    OBJECTIVE:     Mental Status Examination:    Appearance calm and cooperative , adequate hygiene and grooming and good eye contact    Mood anxious   Affect affect appropriate    Speech a normal rate   Thought Processes normal thought processes   Hallucinations no hallucinations present    Thought Content no delusions   Abnormal Thoughts no suicidal thoughts  and no homicidal thoughts    Orientation  oriented to person and place and time   Remote Memory short term memory intact and long term memory intact   Attention Span concentration intact   Intellect Appears to be Above Average Intelligence   Insight Insight intact   Judgement judgment was intact   Muscle Strength Muscle strength and tone were normal   Language no difficulty naming common objects   Fund of Knowledge displays adequate knowledge of current events   Pain none   Pain Scale 0       Laboratory Results: No results found for this or any previous visit. Assessment/Plan:       Diagnoses and all orders for this visit:    LEAH (generalized anxiety disorder)    Gender identity uncertainty    Mixed obsessional thoughts and acts          Treatment Recommendations- Risks Benefits      Immediate Medical/Psychiatric/Psychotherapy Treatments and Any Precautions:  reviewed medication continue with treatment plan    Risks, Benefits And Possible Side Effects Of Medications:  {PSYCH RISK, BENEFITS AND POSSIBLE SIDE EFFECTS (Optional):40495         Psychotherapy Provided: 30min  Supportive therapy  Medication evaluation  Mood assessment    Goals discussed in session: Maintain stable mood         Treatment Plan:    Completed and signed during the session: Yes - Treatment Plan done but not signed at time of office visit due to:  Plan reviewed by video and verbal consent given due to virtual visit. Treatment Plan sent to patient via [employer] for signature.       Analia Pillai, PhD 10/02/23

## 2023-10-02 NOTE — BH TREATMENT PLAN
TREATMENT PLAN (Medication Management Only)         [facility] [facility]     Name and Date of Birth:  Oziel hill 2005  Date of Treatment Plan: August 4, 2020, updated November 11, 2020, June 23, 2021, December 22, 2021, July 13, 2022, January 3, 2023 October 2, 2023 diagnosis/Diagnoses:    1. Mixed obsessional thoughts and acts       Strengths/Personal Resources for Self-Care: supportive family, taking medications as prescribed, ability to communicate needs, ability to listen, average or above intelligence. Area/Areas of need (in own words): obsessive-compulsive symptoms  1.         Long Term Goal: continue improvement in obsessive thoughts and compulsions  Target Date: 6 months - 4/2/2024  Person/Persons responsible for completion of goal: Oziel and provider  2.         Short Term Objective (s) - How will we reach this goal?:   A. Provider new recommended medication/dosage changes and/or continue medication(s): continue current medications as prescribed. B. reframing negative thoughts. C. Recognizing distorted thought  Target Date: 6 months - 4/2/2024  Person/Persons Responsible for Completion of Goal: Oziel and hillary  Progress Towards Goals: continuing treatment  Treatment Modality: medication management every 3 months  Review due 180 days from date of this plan: 6 months -4/2/2024  Expected length of service: ongoing treatment  My Physician/PA/NP and I have developed this plan together and I agree to work on the goals and objectives.  I understand the treatment goals that were developed for my treatment.

## 2023-11-22 ENCOUNTER — TELEMEDICINE (OUTPATIENT)
Dept: PSYCHIATRY | Facility: CLINIC | Age: 18
End: 2023-11-22

## 2023-11-22 DIAGNOSIS — F66 GENDER IDENTITY UNCERTAINTY: ICD-10-CM

## 2023-11-22 DIAGNOSIS — G47.09 OTHER INSOMNIA: Primary | ICD-10-CM

## 2023-11-22 DIAGNOSIS — F42.2 MIXED OBSESSIONAL THOUGHTS AND ACTS: ICD-10-CM

## 2023-11-22 DIAGNOSIS — F41.1 GAD (GENERALIZED ANXIETY DISORDER): ICD-10-CM

## 2023-11-22 RX ORDER — TRAZODONE HYDROCHLORIDE 50 MG/1
50 TABLET ORAL
Qty: 90 TABLET | Refills: 2 | Status: SHIPPED | OUTPATIENT
Start: 2023-11-22 | End: 2023-12-03 | Stop reason: SDUPTHER

## 2023-11-29 NOTE — PSYCH
Visit Time    Visit Start Time: 4:30  Visit Stop Time: 5:00  Total Visit Duration:  30 minutes    Subjective:     Patient ID: Kal Melissa is a 19 y.o. male. History of OCD and depression gender uncertainty seen for medication management and mood assessment. Argenis Manuel reports he is up for fellowship and is not quite sure if he can work and still do the fellowship which is causing increased anxiety. He has had some OCD thoughts. He took the LSATs and is not sure how he did. He worries about his future and if he will be admitted to law school. Discussed reframing automatic negative thoughts thinking of positives and his accomplishments. He reports he is eating and sleeping. Takes medication as prescribed friends are supportive on a scale of 0-10 depression is listed as a 7 and anxiety 8 which is higher than last time we spoke. Reassures me that this is situational and he has no suicidal ideations. He will call if anxiety becomes worse. HPI ROS Appetite Changes and Sleep: normal appetite and normal energy level    Review Of Systems:     Mood Anxiety and Depression   Behavior Normal    Thought Content Normal   General Emotional Problems   Personality Normal   Other Psych Symptoms Normal   Constitutional As Noted in HPI   ENT As Noted in HPI   Cardiovascular As Noted in HPI   Respiratory As Noted in HPI   Gastrointestinal As Noted in HPI   Genitourinary As Noted in HPI   Musculoskeletal As Noted in HPI   Integumentary As Noted in HPI   Neurological As Noted in HPI   Endocrine Normal    Other Symptoms Normal        Laboratory Results:     Substance Abuse History:  Social History     Substance and Sexual Activity   Drug Use Never       Family Psychiatric History: History reviewed. No pertinent family history.     The following portions of the patient's history were reviewed and updated as appropriate: allergies, current medications, past family history, past medical history, past social history, past surgical history, and problem list.    Social History     Socioeconomic History    Marital status: Single     Spouse name: Not on file    Number of children: Not on file    Years of education: Not on file    Highest education level: Not on file   Occupational History    Not on file   Tobacco Use    Smoking status: Never    Smokeless tobacco: Never   Substance and Sexual Activity    Alcohol use: Never    Drug use: Never    Sexual activity: Not on file   Other Topics Concern    Not on file   Social History Narrative    ** Merged History Encounter **          Social Determinants of Health     Financial Resource Strain: Not on file   Food Insecurity: Not on file   Transportation Needs: Not on file   Physical Activity: Not on file   Stress: Not on file   Social Connections: Not on file   Intimate Partner Violence: Not on file   Housing Stability: Not on file     Social History     Social History Narrative    ** Merged History Encounter **            Objective:       Mental status:  Appearance calm and cooperative , adequate hygiene and grooming, and good eye contact    Mood depressed and anxious   Affect affect appropriate    Speech a normal rate   Thought Processes normal thought processes   Hallucinations no hallucinations present    Thought Content no delusions   Abnormal Thoughts no suicidal thoughts  and no homicidal thoughts    Orientation  oriented to person and place and time   Remote Memory short term memory intact and long term memory intact   Attention Span concentration intact   Intellect Appears to be Above Average Intelligence   Insight Insight intact   Judgement judgment was intact   Muscle Strength Muscle strength and tone were normal   Language no difficulty naming common objects   Fund of Knowledge displays adequate knowledge of current events   Pain none   Pain Scale 0       Assessment/Plan:       Diagnoses and all orders for this visit:    Other insomnia  -     traZODone (DESYREL) 50 mg tablet;  Take 1 tablet (50 mg total) by mouth daily at bedtime    Mixed obsessional thoughts and acts    LEAH (generalized anxiety disorder)    Gender identity uncertainty            Treatment Recommendations- Risks Benefits      Immediate Medical/Psychiatric/Psychotherapy Treatments and Any Precautions: Continue treatment plan: Problems or concerns    Risks, Benefits And Possible Side Effects Of Medications:  {PSYCH RISK, BENEFITS AND POSSIBLE SIDE EFFECTS (Optional):89483       Psychotherapy Provided:30 min Individual psychotherapy provided.    Supportive therapy  Medication evaluation  Mood assessment  Coping skills discussed    Goals discussed in session: Maintain stable mood

## 2023-12-01 ENCOUNTER — TELEPHONE (OUTPATIENT)
Dept: PSYCHIATRY | Facility: CLINIC | Age: 18
End: 2023-12-01

## 2023-12-01 NOTE — TELEPHONE ENCOUNTER
Received call from patient stating that he is in school right now so the script for Trazodone 50 mg needs to be sent to [place], [place]. Patient states the pharmacy in [address] didn't fill the Trazodone yet. Patient wants script to [place], [place]. Next appointment 2/13/2024 Virtual Dr Radha Barboza. Will be in [place] for virtual visit states pt.

## 2023-12-03 DIAGNOSIS — G47.09 OTHER INSOMNIA: ICD-10-CM

## 2023-12-03 RX ORDER — TRAZODONE HYDROCHLORIDE 50 MG/1
50 TABLET ORAL
Qty: 90 TABLET | Refills: 2 | Status: SHIPPED | OUTPATIENT
Start: 2023-12-03

## 2024-02-13 ENCOUNTER — TELEMEDICINE (OUTPATIENT)
Dept: PSYCHIATRY | Facility: CLINIC | Age: 19
End: 2024-02-13
Payer: COMMERCIAL

## 2024-02-13 DIAGNOSIS — F66 GENDER IDENTITY UNCERTAINTY: ICD-10-CM

## 2024-02-13 DIAGNOSIS — G47.09 OTHER INSOMNIA: ICD-10-CM

## 2024-02-13 DIAGNOSIS — F41.1 GAD (GENERALIZED ANXIETY DISORDER): ICD-10-CM

## 2024-02-13 DIAGNOSIS — F42.2 MIXED OBSESSIONAL THOUGHTS AND ACTS: Primary | ICD-10-CM

## 2024-02-13 PROCEDURE — 99214 OFFICE O/P EST MOD 30 MIN: CPT | Performed by: NURSE PRACTITIONER

## 2024-02-13 PROCEDURE — 90833 PSYTX W PT W E/M 30 MIN: CPT | Performed by: NURSE PRACTITIONER

## 2024-02-13 RX ORDER — TRAZODONE HYDROCHLORIDE 50 MG/1
75 TABLET ORAL
Qty: 135 TABLET | Refills: 2 | Status: SHIPPED | OUTPATIENT
Start: 2024-02-13

## 2024-02-13 NOTE — PSYCH
Virtual Regular Visit    Problem List Items Addressed This Visit          Other    LEAH (generalized anxiety disorder)    Gender identity uncertainty    Mixed obsessional thoughts and acts - Primary     Reason for visit is   Chief Complaint   Patient presents with    Anxiety    Depression    gender identity issue    OCD     Encounter provider Tahira Wellington, PhD    Provider located at [address]  [address]  [facility] [address] [phone]    Recent Visits  No visits were found meeting these conditions.  Showing recent visits within past 7 days and meeting all other requirements  Today's Visits  Date Type Provider Dept   02/13/24 Telemedicine Tahira Wellington, PhD  Psychiatric [facility]   Showing today's visits and meeting all other requirements  Future Appointments  No visits were found meeting these conditions.  Showing future appointments within next 150 days and meeting all other requirements       After connecting through televideo, the patient was identified by name and date of birth. Oziel Navarro was informed that this is a telemedicine visit and that the visit is being conducted through the Epic Embedded platform. He agrees to proceed. which may not be secure and therefore, might not be HIPAA-compliant.  My office door was closed. No one else was in the room.  He acknowledged consent and understanding of privacy and security of the video platform. The patient has agreed to participate and understands they can discontinue the visit at any time.    SUBJECTIVE:    Oziel Navarro is a 19 y.o. male with a history of OCD anxiety and depression seen for medication management and mood assessment.  Oziel reports he is studying for his LSATs reports he is fairly confident in his score.  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  Reports appetite and sleep patterns are good, he is social.  He denies  depression or suicidal ideations.  Mild anxiety is reported.  Takes medication as prescribed and feels that it helps him.  Family are supportive.  Denies obsessions and compulsions.    HPI ROS Appetite Changes and Sleep: normal appetite and normal energy level    Review Of Systems:     Mood Anxiety   Behavior Normal    Thought Content Normal   General Emotional Problems   Personality Normal   Other Psych Symptoms Normal   Constitutional As Noted in HPI   ENT As Noted in HPI   Cardiovascular As Noted in HPI   Respiratory As Noted in HPI   Gastrointestinal As Noted in HPI   Genitourinary As Noted in HPI   Musculoskeletal As Noted in HPI   Integumentary As Noted in HPI   Neurological As Noted in HPI   Endocrine Normal    Other Symptoms Normal        Substance Abuse History:    Social History     Substance and Sexual Activity   Drug Use Never       Family Psychiatric History:     History reviewed. No pertinent family history.    Social History     Socioeconomic History    Marital status: Single     Spouse name: Not on file    Number of children: Not on file    Years of education: Not on file    Highest education level: Not on file   Occupational History    Not on file   Tobacco Use    Smoking status: Never    Smokeless tobacco: Never   Substance and Sexual Activity    Alcohol use: Never    Drug use: Never    Sexual activity: Not on file   Other Topics Concern    Not on file   Social History Narrative    ** Merged History Encounter **          Social Determinants of Health     Financial Resource Strain: Not on file   Food Insecurity: No Food Insecurity (5/6/2023)    Received from [facility]    Hunger Vital Sign     Worried About Running Out of Food in the Last Year: Never true     Ran Out of Food in the Last Year: Never true   Transportation Needs: Not on file   Physical Activity: Not on file   Stress: Not on file   Social Connections: Not on file   Intimate Partner Violence: Not on file   Housing Stability: Not on  file       Past Medical History:   Diagnosis Date    Allergic rhinitis     Anxiety     Asthma     Seasonal allergies        Past Surgical History:   Procedure Laterality Date    NO PAST SURGERIES         Current Outpatient Medications   Medication Sig Dispense Refill    traZODone (DESYREL) 50 mg tablet Take 1 tablet (50 mg total) by mouth daily at bedtime 90 tablet 2    albuterol (PROVENTIL HFA,VENTOLIN HFA) 90 mcg/act inhaler Inhale 2 puffs every 6 (six) hours as needed for wheezing.      diphenhydrAMINE (BENADRYL) 50 MG tablet Take 50 mg by mouth every 6 (six) hours as needed for itching.      EPINEPHrine (EPIPEN) 0.3 mg/0.3 mL SOAJ       fluticasone (FLONASE) 50 mcg/act nasal spray 1 spray into each nostril daily as needed for rhinitis      hydrOXYzine pamoate (VISTARIL) 25 mg capsule Take 25 mg by mouth 2 (two) times a day as needed for anxiety Take 2 capsules po BID prn for anxiety      Loratadine (CLARITIN PO) Take by mouth daily as needed       MELATONIN PO Take by mouth      montelukast (SINGULAIR) 10 mg tablet Take 10 mg by mouth daily at bedtime.      Multiple Vitamins-Minerals (Multivitamin Adult) CHEW Chew daily      sertraline (ZOLOFT) 100 mg tablet TAKE 2 TABLETS BY MOUTH EVERY  tablet 0     No current facility-administered medications for this visit.        Allergies   Allergen Reactions    Nuts - Food Allergy Anaphylaxis     Tree nuts and peanuts    Pollen Extract Nasal Congestion       The following portions of the patient's history were reviewed and updated as appropriate: allergies, current medications, past family history, past medical history, past social history, past surgical history, and problem list.    OBJECTIVE:     Mental Status Examination:    Appearance calm and cooperative , adequate hygiene and grooming, and good eye contact    Mood anxious   Affect affect appropriate    Speech a normal rate   Thought Processes normal thought processes   Hallucinations no hallucinations present     Thought Content no delusions   Abnormal Thoughts no suicidal thoughts  and no homicidal thoughts    Orientation  oriented to person and place and time   Remote Memory short term memory intact and long term memory intact   Attention Span concentration intact   Intellect Appears to be of Average Intelligence   Insight Insight intact   Judgement judgment was intact   Muscle Strength Muscle strength and tone were normal   Language no difficulty naming common objects and no difficulty repeating a phrase    Fund of Knowledge displays adequate knowledge of current events   Pain none   Pain Scale 0       Laboratory Results: No results found for this or any previous visit.    Assessment/Plan:       Diagnoses and all orders for this visit:    Mixed obsessional thoughts and acts    Gender identity uncertainty    LEAH (generalized anxiety disorder)          Treatment Recommendations- Risks Benefits      Immediate Medical/Psychiatric/Psychotherapy Treatments and Any Precautions: Continue treatment plan    Risks, Benefits And Possible Side Effects Of Medications:  {PSYCH RISK, BENEFITS AND POSSIBLE SIDE EFFECTS (Optional):91772 30 minutes    Psychotherapy Provided: 30 minutes  Supportive therapy  Medication evaluation  Mood assessment  Safety plan not developed due to lack of time    Goals discussed in session: Maintain stable mood     Treatment Plan:    Completed and signed during the session: Not applicable - Treatment Plan not due at this session        Tahira Wellington, PhD 02/13/24

## 2024-04-24 DIAGNOSIS — G47.09 OTHER INSOMNIA: ICD-10-CM

## 2024-04-24 RX ORDER — TRAZODONE HYDROCHLORIDE 50 MG/1
75 TABLET ORAL
Qty: 135 TABLET | Refills: 0 | Status: SHIPPED | OUTPATIENT
Start: 2024-04-24

## 2024-06-06 ENCOUNTER — TELEMEDICINE (OUTPATIENT)
Dept: PSYCHIATRY | Facility: CLINIC | Age: 19
End: 2024-06-06
Payer: COMMERCIAL

## 2024-06-06 DIAGNOSIS — F41.1 GAD (GENERALIZED ANXIETY DISORDER): ICD-10-CM

## 2024-06-06 DIAGNOSIS — F66 GENDER IDENTITY UNCERTAINTY: ICD-10-CM

## 2024-06-06 DIAGNOSIS — F42.2 MIXED OBSESSIONAL THOUGHTS AND ACTS: Primary | ICD-10-CM

## 2024-06-06 PROCEDURE — 99214 OFFICE O/P EST MOD 30 MIN: CPT | Performed by: NURSE PRACTITIONER

## 2024-06-06 PROCEDURE — 90833 PSYTX W PT W E/M 30 MIN: CPT | Performed by: NURSE PRACTITIONER

## 2024-06-07 NOTE — PSYCH
Virtual Regular Visit    Problem List Items Addressed This Visit          Behavioral Health    LEAH (generalized anxiety disorder)    Gender identity uncertainty    Mixed obsessional thoughts and acts - Primary     Reason for visit is   Chief Complaint   Patient presents with    Depression    Anxiety    Medication Management     Encounter provider Tahira Wellington, PhD    Provider located at [address]  [address]  [facility] [id number] [phone]    Recent Visits  Date Type Provider Dept   06/06/24 Telemedicine Tahira Wellington, PhD  Psychiatric [facility]   Showing recent visits within past 7 days and meeting all other requirements  Future Appointments  No visits were found meeting these conditions.  Showing future appointments within next 150 days and meeting all other requirements       After connecting through televideo, the patient was identified by name and date of birth. Oziel Navarro was informed that this is a telemedicine visit and that the visit is being conducted through the Epic Embedded platform. He agrees to proceed. which may not be secure and therefore, might not be HIPAA-compliant.  My office door was closed. No one else was in the room.  He acknowledged consent and understanding of privacy and security of the video platform. The patient has agreed to participate and understands they can discontinue the visit at any time.  This note was not shared with the patient due to this is a psychotherapy note  SUBJECTIVE:    Oziel Navarro is a 19 y.o. male with a history of OCD, anxiety, depression seen for medication management and mood assessment. 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 He is feeling better about himself and identity. Oziel will be attending  [employer] school in the fall, and also getting his graduate degree in [employer] Government. He reports being social, medications help stabilize his mood and he rarely has an intrusive thought. Appetite and sleep are reported as good. Denies depression, anxiety, obsessions or compulsions. No SI, he is excited about his future.Takes medication as prescribed and has a good support system.    HPI ROS Appetite Changes and Sleep: normal appetite and normal energy level    Review Of Systems:     Mood Anxiety situational   Behavior Normal    Thought Content Normal   General Normal    Personality Normal   Other Psych Symptoms Normal   Constitutional As Noted in HPI   ENT As Noted in HPI   Cardiovascular As Noted in HPI   Respiratory As Noted in HPI   Gastrointestinal As Noted in HPI   Genitourinary As Noted in HPI   Musculoskeletal As Noted in HPI   Integumentary As Noted in HPI   Neurological As Noted in HPI   Endocrine Normal    Other Symptoms Normal        Substance Abuse History:    Social History     Substance and Sexual Activity   Drug Use Never       Family Psychiatric History:     History reviewed. No pertinent family history.    Social History     Socioeconomic History    Marital status: Single     Spouse name: Not on file    Number of children: Not on file    Years of education: Not on file    Highest education level: Not on file   Occupational History    Not on file   Tobacco Use    Smoking status: Never    Smokeless tobacco: Never   Substance and Sexual Activity    Alcohol use: Never    Drug use: Never    Sexual activity: Not on file   Other Topics Concern    Not on file   Social History Narrative    ** Merged History Encounter **          Social Determinants of Health     Financial Resource Strain: Not on file   Food Insecurity: No Food Insecurity (5/6/2023)    [facility]    Hunger Vital Sign     Worried About Running Out of Food in the Last Year: Never true     Ran Out of Food in  the Last Year: Never true   Transportation Needs: Not on file   Physical Activity: Not on file   Stress: Not on file   Social Connections: Not on file   Intimate Partner Violence: Not on file   Housing Stability: Not on file       Past Medical History:   Diagnosis Date    Allergic rhinitis     Anxiety     Asthma     Seasonal allergies        Past Surgical History:   Procedure Laterality Date    NO PAST SURGERIES         Current Outpatient Medications   Medication Sig Dispense Refill    albuterol (PROVENTIL HFA,VENTOLIN HFA) 90 mcg/act inhaler Inhale 2 puffs every 6 (six) hours as needed for wheezing.      EPINEPHrine (EPIPEN) 0.3 mg/0.3 mL SOAJ       fluticasone (FLONASE) 50 mcg/act nasal spray 1 spray into each nostril daily as needed for rhinitis      hydrOXYzine pamoate (VISTARIL) 25 mg capsule Take 25 mg by mouth 2 (two) times a day as needed for anxiety Take 2 capsules po BID prn for anxiety      Loratadine (CLARITIN PO) Take by mouth daily as needed       montelukast (SINGULAIR) 10 mg tablet Take 10 mg by mouth daily at bedtime.      Multiple Vitamins-Minerals (Multivitamin Adult) CHEW Chew daily      sertraline (ZOLOFT) 100 mg tablet TAKE 2 TABLETS BY MOUTH EVERY  tablet 0    traZODone (DESYREL) 50 mg tablet Take 1.5 tablets (75 mg total) by mouth daily at bedtime 135 tablet 0     No current facility-administered medications for this visit.        Allergies   Allergen Reactions    Nuts - Food Allergy Anaphylaxis     Tree nuts and peanuts    Pollen Extract Nasal Congestion       The following portions of the patient's history were reviewed and updated as appropriate: allergies, current medications, past family history, past medical history, past social history, past surgical history, and problem list.    OBJECTIVE:     Mental Status Examination:    Appearance calm and cooperative , adequate hygiene and grooming, and good eye contact    Mood euthymic   Affect affect appropriate    Speech a normal rate    Thought Processes normal thought processes   Hallucinations no hallucinations present    Thought Content no delusions   Abnormal Thoughts no suicidal thoughts  and no homicidal thoughts    Orientation  oriented to person and place and time   Remote Memory short term memory intact and long term memory intact   Attention Span concentration intact   Intellect Appears to be of Above Average Intelligence   Insight Insight intact   Judgement judgment was intact   Muscle Strength Muscle strength and tone were normal   Language no difficulty naming common objects   Fund of Knowledge displays adequate knowledge of current events   Pain none   Pain Scale 0       Laboratory Results: No results found for this or any previous visit.    Assessment/Plan:       Diagnoses and all orders for this visit:    Mixed obsessional thoughts and acts    Gender identity uncertainty    LEAH (generalized anxiety disorder)          Treatment Recommendations- Risks Benefits      Immediate Medical/Psychiatric/Psychotherapy Treatments and Any Precautions: continue with treatment plan    Risks, Benefits And Possible Side Effects Of Medications:  {PSYCH RISK, BENEFITS AND POSSIBLE SIDE EFFECTS (Optional):10411       Psychotherapy Provided: 30 min  Supportive therapy  Medication evaluation  Mood assessment  Coping with rare intrusive thoughts  Normalized and validated his feelings  Safety plan not compiled; however, he is aware of who to call in crisis, 988 and ED if necessary    Goals discussed in session:maintain stable mood and control OCD     Treatment Plan:    Completed and signed during the session: Yes - Treatment Plan done but not signed at time of office visit due to:  Plan reviewed by video and verbal consent given due to virtual visit. Treatment Plan sent to patient via [employer] for signature.      Tahira Wellington, PhD 06/07/24

## 2024-06-07 NOTE — BH TREATMENT PLAN
TREATMENT PLAN (Medication Management Only)        [facility]    Name and Date of Birth:  Oziel MEYER Mruczinski 19 y.o. 2005  Date of Treatment Plan: June 6 2024  Diagnosis/Diagnoses:    1. Mixed obsessional thoughts and acts    2. Gender identity uncertainty    3. LEAH (generalized anxiety disorder)      Strengths/Personal Resources for Self-Care: supportive family, supportive friends, taking medications as prescribed, ability to adapt to life changes, ability to communicate needs.  Area/Areas of need (in own words): obsessive-compulsive symptoms  1. Long Term Goal: improve control of obsessive thoughts and compulsions  Target Date:6 months - 12/7/2024  Person/Persons responsible for completion of goal: hillary Ludwig, family  2.  Short Term Objective (s) - How will we reach this goal?:   A. Provider new recommended medication/dosage changes and/or continue medication(s): continue current medications as prescribed.  B.  Structure, self care, affirmations .  C.  Medications, adequate sleep and rest .  Target Date:6 months - 12/7/2024  Person/Persons Responsible for Completion of Goal: hillary Ludwig, family  Progress Towards Goals: stable  Treatment Modality: medication management every 3 months  Review due 180 days from date of this plan: 6 months - 12/7/2024  Expected length of service: maintenance  My Physician/PA/NP and I have developed this plan together and I agree to work on the goals and objectives. I understand the treatment goals that were developed for my treatment.

## 2024-08-01 ENCOUNTER — ESTABLISHED COMPREHENSIVE EXAM (OUTPATIENT)
Dept: URBAN - METROPOLITAN AREA CLINIC 10 | Facility: CLINIC | Age: 19
End: 2024-08-01

## 2024-08-01 DIAGNOSIS — H52.13: ICD-10-CM

## 2024-08-01 DIAGNOSIS — Z01.00: ICD-10-CM

## 2024-08-01 PROCEDURE — MISCOPTOS MISCELLANEOUS, OPTOS

## 2024-08-01 PROCEDURE — 92015 DETERMINE REFRACTIVE STATE: CPT

## 2024-08-01 PROCEDURE — 92014 COMPRE OPH EXAM EST PT 1/>: CPT

## 2024-08-01 ASSESSMENT — VISUAL ACUITY
OS_CC: 20/20
OD_CC: 20/20

## 2024-08-01 ASSESSMENT — KERATOMETRY
OD_AXISANGLE2_DEGREES: 98
OD_K2POWER_DIOPTERS: 43.50
OD_K1POWER_DIOPTERS: 42.75
OS_K2POWER_DIOPTERS: 43.75
OD_AXISANGLE_DEGREES: 8
OS_K1POWER_DIOPTERS: 42.75
OS_AXISANGLE2_DEGREES: 108
OS_AXISANGLE_DEGREES: 18

## 2024-08-01 ASSESSMENT — TONOMETRY
OS_IOP_MMHG: 15
OD_IOP_MMHG: 12

## 2024-08-09 DIAGNOSIS — F42.2 MIXED OBSESSIONAL THOUGHTS AND ACTS: ICD-10-CM

## 2024-08-09 DIAGNOSIS — G47.09 OTHER INSOMNIA: ICD-10-CM

## 2024-08-09 RX ORDER — TRAZODONE HYDROCHLORIDE 50 MG/1
75 TABLET ORAL
Qty: 135 TABLET | Refills: 0 | Status: SHIPPED | OUTPATIENT
Start: 2024-08-09

## 2024-08-09 RX ORDER — SERTRALINE HYDROCHLORIDE 100 MG/1
200 TABLET, FILM COATED ORAL DAILY
Qty: 180 TABLET | Refills: 0 | Status: SHIPPED | OUTPATIENT
Start: 2024-08-09

## 2024-08-09 NOTE — TELEPHONE ENCOUNTER
Reason for call:   [x] Refill   [] Prior Auth  [] Other:     Office:   [] PCP/Provider -   [x] Specialty/Provider - Psych      Does the patient have enough for 3 days?   [] Yes   [x] No - Send as HP to POD

## 2024-09-09 ENCOUNTER — TELEMEDICINE (OUTPATIENT)
Dept: PSYCHIATRY | Facility: CLINIC | Age: 19
End: 2024-09-09

## 2024-09-09 ENCOUNTER — TELEPHONE (OUTPATIENT)
Dept: PSYCHIATRY | Facility: CLINIC | Age: 19
End: 2024-09-09

## 2024-09-09 DIAGNOSIS — F42.2 MIXED OBSESSIONAL THOUGHTS AND ACTS: Primary | ICD-10-CM

## 2024-09-09 DIAGNOSIS — G47.09 OTHER INSOMNIA: ICD-10-CM

## 2024-09-09 DIAGNOSIS — F66 GENDER IDENTITY UNCERTAINTY: ICD-10-CM

## 2024-09-09 DIAGNOSIS — F41.1 GAD (GENERALIZED ANXIETY DISORDER): ICD-10-CM

## 2024-09-09 PROCEDURE — 99214 OFFICE O/P EST MOD 30 MIN: CPT | Performed by: NURSE PRACTITIONER

## 2024-09-09 PROCEDURE — 90833 PSYTX W PT W E/M 30 MIN: CPT | Performed by: NURSE PRACTITIONER

## 2024-09-09 RX ORDER — TRAZODONE HYDROCHLORIDE 100 MG/1
100 TABLET ORAL
Qty: 90 TABLET | Refills: 0 | Status: SHIPPED | OUTPATIENT
Start: 2024-09-09

## 2024-09-09 NOTE — TELEPHONE ENCOUNTER
Spoke with patient on the morning of his appt to inform him we will need all docs. Signed prior to his virtual visit.Patient agreed and said he will sign them through [employer]

## 2024-09-10 NOTE — PSYCH
Virtual Regular Visit    Problem List Items Addressed This Visit          Behavioral Health    Mixed obsessional thoughts and acts - Primary    Gender identity uncertainty    LEAH (generalized anxiety disorder)    Relevant Medications    traZODone (DESYREL) 100 mg tablet     Other Visit Diagnoses       Other insomnia        Relevant Medications    traZODone (DESYREL) 100 mg tablet          Reason for visit is   Chief Complaint   Patient presents with    OCD    Anxiety    Depression    Medication Management     Encounter provider Tahira Wellington, PhD    Provider located at [address]  [address]  [facility] [address] [phone]    Recent Visits  Date Type Provider Dept   09/09/24 Telephone Jennifer Brown [facility]   09/09/24 Telemedicine Tahira Wellington, PhD  [facility]   Showing recent visits within past 7 days and meeting all other requirements  Future Appointments  No visits were found meeting these conditions.  Showing future appointments within next 150 days and meeting all other requirements       After connecting through [employer], the patient was identified by name and date of birth. Oziel Navarro was informed that this is a telemedicine visit and that the visit is being conducted through the Epic Embedded platform. He agrees to proceed. which may not be secure and therefore, might not be HIPAA-compliant.  My office door was closed. No one else was in the room.  He acknowledged consent and understanding of privacy and security of the video platform. The patient has agreed to participate and understands they can discontinue the visit at any time.    SUBJECTIVE:    Oziel Navarro is a 19 y.o. male with a history of OCD, anxiety, depression seen for medication management and mood assessment.  Oziel reports Zoloft has helped his OCD and trazodone helps him sleep.  LEAH-7 score of  8 indicates mild anxiety; however when discussed he clarified anxiety is more moderate than mild.  He is in law school and feels situational anxiety.  OCD symptoms are not present; however, he has intrusive thoughts that the OCD will come back.  He reports coping with these thoughts and able to manage.  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  He reports appetite is good medication helps him sleep and would like it to be 100 mg.  Trazodone increased to 100 mg as needed at night.  Discussed being comfortable with his sexual orientation.  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  Takes medication as prescribed, denies suicidal ideation and family are supportive.  He will call with problems or concerns.  He may go abroad to [place] to study and is starting to learn Polish.  In addition, he is trying to cook healthy in his room and eat less of processed food.    HPI ROS Appetite Changes and Sleep: normal appetite and normal energy level    Review Of Systems:     Mood Anxiety   Behavior Normal    Thought Content Normal   General Emotional Problems and Sleep Disturbances   Personality Normal   Other Psych Symptoms Normal   Constitutional As Noted in HPI   ENT As Noted in HPI   Cardiovascular As Noted in HPI   Respiratory As Noted in HPI   Gastrointestinal As Noted in HPI   Genitourinary As Noted in HPI   Musculoskeletal As Noted in HPI   Integumentary As Noted in HPI   Neurological As Noted in HPI   Endocrine Normal    Other Symptoms Normal        Substance Abuse History:    Social History     Substance and Sexual Activity   Drug Use Never       Family Psychiatric History:     History reviewed. No pertinent family history.    Social History     Socioeconomic History    Marital status: Single     Spouse name: Not on file    Number of children: Not on file     Years of education: Not on file    Highest education level: Not on file   Occupational History    Not on file   Tobacco Use    Smoking status: Never    Smokeless tobacco: Never   Substance and Sexual Activity    Alcohol use: Never    Drug use: Never    Sexual activity: Not on file   Other Topics Concern    Not on file   Social History Narrative    ** Merged History Encounter **          Social Determinants of Health     Financial Resource Strain: Not on file   Food Insecurity: No Food Insecurity (5/6/2023)    [facility]    Hunger Vital Sign     Worried About Running Out of Food in the Last Year: Never true     Ran Out of Food in the Last Year: Never true   Transportation Needs: Not on file   Physical Activity: Not on file   Stress: Not on file   Social Connections: Not on file   Intimate Partner Violence: Not on file   Housing Stability: Not on file       Past Medical History:   Diagnosis Date    Allergic rhinitis     Anxiety     Asthma     Seasonal allergies        Past Surgical History:   Procedure Laterality Date    NO PAST SURGERIES         Current Outpatient Medications   Medication Sig Dispense Refill    traZODone (DESYREL) 100 mg tablet Take 1 tablet (100 mg total) by mouth daily at bedtime as needed for sleep (sleep at bed time) 90 tablet 0    albuterol (PROVENTIL HFA,VENTOLIN HFA) 90 mcg/act inhaler Inhale 2 puffs every 6 (six) hours as needed for wheezing.      EPINEPHrine (EPIPEN) 0.3 mg/0.3 mL SOAJ       fluticasone (FLONASE) 50 mcg/act nasal spray 1 spray into each nostril daily as needed for rhinitis      hydrOXYzine pamoate (VISTARIL) 25 mg capsule Take 25 mg by mouth 2 (two) times a day as needed for anxiety Take 2 capsules po BID prn for anxiety      Loratadine (CLARITIN PO) Take by mouth daily as needed       montelukast (SINGULAIR) 10 mg tablet Take 10 mg by mouth daily at bedtime.      Multiple Vitamins-Minerals (Multivitamin Adult) CHEW Chew daily       sertraline (ZOLOFT) 100 mg tablet Take 2 tablets (200 mg total) by mouth daily 180 tablet 0     No current facility-administered medications for this visit.        Allergies   Allergen Reactions    Nuts - Food Allergy Anaphylaxis     Tree nuts and peanuts    Pollen Extract Nasal Congestion       The following portions of the patient's history were reviewed and updated as appropriate: allergies, current medications, past family history, past medical history, past social history, past surgical history, and problem list.    OBJECTIVE:     Mental Status Examination:    Appearance calm and cooperative , adequate hygiene and grooming, and good eye contact    Mood anxious   Affect affect appropriate    Speech a normal rate   Thought Processes normal thought processes   Hallucinations no hallucinations present    Thought Content no delusions   Abnormal Thoughts no suicidal thoughts  and no homicidal thoughts    Orientation  oriented to person and place and time   Remote Memory short term memory intact and long term memory intact   Attention Span concentration intact   Intellect Appears to be Above Average Intelligence   Insight Insight intact   Judgement judgment was intact   Muscle Strength Muscle strength and tone were normal   Language no difficulty naming common objects   Fund of Knowledge displays adequate knowledge of current events   Pain none   Pain Scale 0       Laboratory Results: No results found for this or any previous visit.    Assessment/Plan:       Diagnoses and all orders for this visit:    Mixed obsessional thoughts and acts    Gender identity uncertainty    LEAH (generalized anxiety disorder)    Other insomnia  -     traZODone (DESYREL) 100 mg tablet; Take 1 tablet (100 mg total) by mouth daily at bedtime as needed for sleep (sleep at bed time)          Treatment Recommendations- Risks Benefits      Immediate Medical/Psychiatric/Psychotherapy Treatments and Any Precautions: Continue treatment plan increase  "trazodone to 100 mg Call with problems or concerns    Risks, Benefits And Possible Side Effects Of Medications:  {PSYCH RISK, BENEFITS AND POSSIBLE SIDE EFFECTS (Optional):04357         Psychotherapy Provided: 30 minutes  Supportive therapy  Medication evaluation  Mood assessment  Surveys reviewed and confirmed  Normalized and validated his feelings  Safety plan not compiled; however, he is aware of who to call in crisis, 988 and ED if necessary  Goals discussed in session: Maintain stable mood     Treatment Plan:    Completed and signed during the session: Not applicable - Treatment Plan not due at this session    I spent 30 minutes minutes with the patient during this visit.    \"Portions of the record may have been created with voice recognition software. Occasional wrong word or \"sound a like\" substitutions may have occurred due to the inherent limitations of voice recognition software. Read the chart carefully and recognize, using context, where substitutions have occurred. Please call if you have any questions. \"      Tahira Wellington, PhD 09/10/24  "

## 2024-11-05 DIAGNOSIS — F42.2 MIXED OBSESSIONAL THOUGHTS AND ACTS: ICD-10-CM

## 2024-11-05 RX ORDER — SERTRALINE HYDROCHLORIDE 100 MG/1
200 TABLET, FILM COATED ORAL DAILY
Qty: 180 TABLET | Refills: 0 | Status: SHIPPED | OUTPATIENT
Start: 2024-11-05

## 2024-11-14 DIAGNOSIS — G47.09 OTHER INSOMNIA: ICD-10-CM

## 2024-11-14 NOTE — TELEPHONE ENCOUNTER
Patient is away at school. [place]    Reason for call:   [x] Refill   [] Prior Auth  [] Other:     Office:   [] PCP/Provider -   [x] Specialty/Provider - [facility]     Medication: traZODone (DESYREL) 100 mg tablet     Dose/Frequency: derrek 1 tablet (100 mg total) by mouth daily at bedtime as needed for sleep     Quantity: 90    Pharmacy: [facility]/pharmacy #[id number] - [place], [place] - [address]     Does the patient have enough for 3 days?   [] Yes   [x] No - Send as HP to POD

## 2024-11-15 RX ORDER — TRAZODONE HYDROCHLORIDE 100 MG/1
100 TABLET ORAL
Qty: 90 TABLET | Refills: 0 | Status: SHIPPED | OUTPATIENT
Start: 2024-11-15

## 2025-07-22 ENCOUNTER — TELEPHONE (OUTPATIENT)
Age: 20
End: 2025-07-22

## 2025-07-22 DIAGNOSIS — F42.2 MIXED OBSESSIONAL THOUGHTS AND ACTS: ICD-10-CM

## 2025-07-22 DIAGNOSIS — G47.09 OTHER INSOMNIA: ICD-10-CM

## 2025-07-22 RX ORDER — TRAZODONE HYDROCHLORIDE 100 MG/1
100 TABLET ORAL
Qty: 90 TABLET | Refills: 0 | Status: SHIPPED | OUTPATIENT
Start: 2025-07-22

## 2025-07-22 RX ORDER — SERTRALINE HYDROCHLORIDE 100 MG/1
200 TABLET, FILM COATED ORAL DAILY
Qty: 180 TABLET | Refills: 0 | Status: SHIPPED | OUTPATIENT
Start: 2025-07-22

## 2025-07-22 NOTE — TELEPHONE ENCOUNTER
Medication Refill Request     Name of Medication sertraline (ZOLOFT) 100 mg tablet   Dose/Frequency 100mg 2 tabs by mouth every day  Quantity 180  Verified pharmacy   [x]  Verified ordering Provider   [x] Dr Wellington  Does patient have enough for the next 3 days? Yes [x] No []  Does patient have a follow-up appointment scheduled? Yes [x] No []   If so when is appointment: 8/26/25 at 4:45p    **Last office visit was 9/9/24**      Medication Refill Request     Name of Medication traZODone (DESYREL) 100 mg tablet   Dose/Frequency 10mg 1 tab by mouth daily at bedtime as needed for sleep  Quantity 90  Verified pharmacy   [x]  Verified ordering Provider   [x] PA  last ordered by  Ermelinda Thayer  Does patient have enough for the next 3 days? Yes [x] No []  Does patient have a follow-up appointment scheduled? 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

## 2025-07-22 NOTE — TELEPHONE ENCOUNTER
Patient contacted the office to schedule a follow up visit with provider. Patient is now scheduled for 8/26/25  at 4:45p virtually.